# Patient Record
Sex: FEMALE | Race: WHITE | Employment: FULL TIME | URBAN - NONMETROPOLITAN AREA
[De-identification: names, ages, dates, MRNs, and addresses within clinical notes are randomized per-mention and may not be internally consistent; named-entity substitution may affect disease eponyms.]

---

## 2017-09-06 ENCOUNTER — OFFICE VISIT (OUTPATIENT)
Dept: GASTROENTEROLOGY | Age: 47
End: 2017-09-06
Payer: COMMERCIAL

## 2017-09-06 VITALS
OXYGEN SATURATION: 98 % | HEART RATE: 97 BPM | HEIGHT: 68 IN | SYSTOLIC BLOOD PRESSURE: 124 MMHG | DIASTOLIC BLOOD PRESSURE: 82 MMHG | WEIGHT: 252.8 LBS | BODY MASS INDEX: 38.31 KG/M2

## 2017-09-06 DIAGNOSIS — R19.7 DIARRHEA, UNSPECIFIED TYPE: ICD-10-CM

## 2017-09-06 PROCEDURE — 99213 OFFICE O/P EST LOW 20 MIN: CPT | Performed by: NURSE PRACTITIONER

## 2017-09-06 RX ORDER — DICYCLOMINE HYDROCHLORIDE 10 MG/1
10 CAPSULE ORAL
Qty: 270 CAPSULE | Refills: 3 | Status: SHIPPED | OUTPATIENT
Start: 2017-09-06

## 2017-09-06 ASSESSMENT — ENCOUNTER SYMPTOMS
ABDOMINAL DISTENTION: 0
ANAL BLEEDING: 0
PHOTOPHOBIA: 0
CHOKING: 0
DIARRHEA: 1
WHEEZING: 0
ABDOMINAL PAIN: 0
BACK PAIN: 1
NAUSEA: 0
SORE THROAT: 0
RECTAL PAIN: 0
BLOOD IN STOOL: 0
CONSTIPATION: 0
COUGH: 0
VOMITING: 0

## 2017-10-02 ENCOUNTER — OFFICE VISIT (OUTPATIENT)
Dept: OBGYN | Age: 47
End: 2017-10-02
Payer: COMMERCIAL

## 2017-10-02 VITALS
DIASTOLIC BLOOD PRESSURE: 72 MMHG | TEMPERATURE: 98.4 F | HEIGHT: 68 IN | HEART RATE: 110 BPM | BODY MASS INDEX: 37.59 KG/M2 | WEIGHT: 248 LBS | SYSTOLIC BLOOD PRESSURE: 135 MMHG

## 2017-10-02 DIAGNOSIS — N89.8 VAGINAL DISCHARGE: ICD-10-CM

## 2017-10-02 DIAGNOSIS — R61 NIGHT SWEATS: ICD-10-CM

## 2017-10-02 DIAGNOSIS — Z01.419 WOMEN'S ANNUAL ROUTINE GYNECOLOGICAL EXAMINATION: Primary | ICD-10-CM

## 2017-10-02 DIAGNOSIS — N76.0 BV (BACTERIAL VAGINOSIS): ICD-10-CM

## 2017-10-02 DIAGNOSIS — N92.1 MENOMETRORRHAGIA: ICD-10-CM

## 2017-10-02 DIAGNOSIS — Z12.31 ENCOUNTER FOR SCREENING MAMMOGRAM FOR BREAST CANCER: ICD-10-CM

## 2017-10-02 DIAGNOSIS — Z12.4 SCREENING FOR CERVICAL CANCER: ICD-10-CM

## 2017-10-02 DIAGNOSIS — B96.89 BV (BACTERIAL VAGINOSIS): ICD-10-CM

## 2017-10-02 PROCEDURE — 87210 SMEAR WET MOUNT SALINE/INK: CPT | Performed by: OBSTETRICS & GYNECOLOGY

## 2017-10-02 PROCEDURE — 99396 PREV VISIT EST AGE 40-64: CPT | Performed by: OBSTETRICS & GYNECOLOGY

## 2017-10-02 RX ORDER — METRONIDAZOLE 500 MG/1
500 TABLET ORAL 2 TIMES DAILY
Qty: 14 TABLET | Refills: 0 | Status: SHIPPED | OUTPATIENT
Start: 2017-10-02 | End: 2017-10-09

## 2017-10-02 RX ORDER — PRAVASTATIN SODIUM 40 MG
TABLET ORAL
COMMUNITY
Start: 2017-09-12

## 2017-10-02 ASSESSMENT — ENCOUNTER SYMPTOMS
EYES NEGATIVE: 1
ALLERGIC/IMMUNOLOGIC NEGATIVE: 1
GASTROINTESTINAL NEGATIVE: 1
RESPIRATORY NEGATIVE: 1

## 2017-10-02 NOTE — PROGRESS NOTES
Abdominal: Soft. Bowel sounds are normal. She exhibits no distension. There is no tenderness. Genitourinary: Rectum normal, vagina normal and uterus normal. There is no rash or lesion on the right labia. There is no rash or lesion on the left labia. Cervix exhibits no motion tenderness. Right adnexum displays no mass, no tenderness and no fullness. Left adnexum displays no mass, no tenderness and no fullness. Genitourinary Comments: Uterus 9 wk size   Musculoskeletal: Normal range of motion. She exhibits no edema or tenderness. Lymphadenopathy:     She has no cervical adenopathy. She has no axillary adenopathy. Neurological: She is alert and oriented to person, place, and time. Skin: Skin is warm and dry. Psychiatric: She has a normal mood and affect. Her behavior is normal. Judgment and thought content normal.       Assessment  1. Women's annual routine gynecological examination  JERRY DIGITAL SCREEN W OR WO CAD BILATERAL   2. Screening for cervical cancer  PAP SMEAR   3. Night sweats  TSH with Reflex    Follicle Stimulating Hormone    CBC   4. Menometrorrhagia  TSH with Reflex    Follicle Stimulating Hormone    CBC    US Pelvis Complete    US Non OB Transvaginal   5. Encounter for screening mammogram for breast cancer  JERRY DIGITAL SCREEN W OR WO CAD BILATERAL   6. Vaginal discharge  POCT Wet Prep   7. BV (bacterial vaginosis)  metroNIDAZOLE (FLAGYL) 500 MG tablet       Plan     1. Pap smear  2. Tsh, fsh, cbc  3. Mammogram  4. Wet prep  5. Pelvic ultrasound  6. Flagyl 500mg bid x 7days  7.  RTC one year or prn, will notify of results

## 2017-10-02 NOTE — MR AVS SNAPSHOT
After Visit Summary             [address]   10/2/2017 9:30 AM   Office Visit    Description:  Female : 1970   Provider:  Doug Santos MD   Department:  [address] [place] [address] Rd and Future Appointments         Below is a list of your follow-up and future appointments. This may not be a complete list as you may have made appointments directly with providers that we are not aware of or your providers may have made some for you. Please call your providers to confirm appointments. It is important to keep your appointments. Please bring your current insurance card, photo ID, co-pay, and all medication bottles to your appointment. If self-pay, payment is expected at the time of service. Your To-Do List     Future Appointments Provider Department Dept Phone    10/17/2017 10:30 AM [facility] ROOM 3 [facility] Ultrasound [phone]    PATIENT INSTRUCTIONS: *REGISTER AT [facility] 30 MINUTES PRIOR TO EXAM. *MUST HAVE WRITTEN ORDER. *32 OZ OF FLUID ONE HOUR BEFORE EXAM. *Patient is to report to [employer] [address] to register. *Upon entering the pavilion you will face a granite ball. Take the elevators tothe right to the second floor. Upon exiting the elevator you will face thefront door of the [facility]. 10/17/2017 11:15 AM [facility] US ROOM 3 [facility] Ultrasound [phone]    10/17/2017 11:45 AM [facility] MAMMO RM 1 [facility] [phone]    * Arrive 5 minutes prior to appointment. * No lotions, powders, or deodorants under the arms or in the breast area. * Bring previous mammogram films if not done here. * Report to [employer] [address] to register. Take the elevators to the second floor. Upon exiting the elevator you will face the front door of the [facility].     Future Orders Complete By Expires    CBC [FYG844 Custom]  10/2/2017 9983    Follicle Stimulating Hormone [LAB86 Custom]  10/2/2017 10/2/2018 overall health and may have suggested ways to take good care of yourself. He or she also may have recommended tests. At home, you can help prevent illness with healthy eating, regular exercise, and other steps. Follow-up care is a key part of your treatment and safety. Be sure to make and go to all appointments, and call your doctor if you are having problems. It's also a good idea to know your test results and keep a list of the medicines you take. How can you care for yourself at home? · Reach and stay at a healthy weight. This will lower your risk for many problems, such as obesity, diabetes, heart disease, and high blood pressure. · Get at least 30 minutes of physical activity on most days of the week. Walking is a good choice. You also may want to do other activities, such as running, swimming, cycling, or playing tennis or team sports. Discuss any changes in your exercise program with your doctor. · Do not smoke or allow others to smoke around you. If you need help quitting, talk to your doctor about stop-smoking programs and medicines. These can increase your chances of quitting for good. · Talk to your doctor about whether you have any risk factors for sexually transmitted infections (STIs). Having one sex partner (who does not have STIs and does not have sex with anyone else) is a good way to avoid these infections. · Use birth control if you do not want to have children at this time. Talk with your doctor about the choices available and what might be best for you. · Protect your skin from too much sun. When you're outdoors from 10 a.m. to 4 p.m., stay in the shade or cover up with clothing and a hat with a wide brim. Wear sunglasses that block UV rays. Even when it's cloudy, put broad-spectrum sunscreen (SPF 30 or higher) on any exposed skin. · See a dentist one or two times a year for checkups and to have your teeth cleaned. · Wear a seat belt in the car. have tests for STIs. You may be at risk if you have sex with more than one person, especially if you do not wear a condom. · Testicular cancer exam. Ask your doctor whether you should check your testicles regularly. · Prostate exam. Talk to your doctor about whether you should have a blood test (called a PSA test) for prostate cancer. Experts differ on whether and when men should have this test. Some experts suggest it if you are older than 39 and are -American or have a father or brother who got prostate cancer when he was younger than 72. When should you call for help? Watch closely for changes in your health, and be sure to contact your doctor if you have any problems or symptoms that concern you. Where can you learn more? Go to https://ConatixpeNovaforaeb.PanGo [email or web address] org and sign in to your [employer] account. Enter P072 in the Local Yokel Media box to learn more about \"Well Visit, Ages 25 to 48: Care Instructions. \"     If you do not have an account, please click on the \"Sign Up Now\" link. Current as of: July 19, 2016  Content Version: 11.3  © 0244-0889 [employer]urb. Care instructions adapted under license by [facility] ([facility]). If you have questions about a medical condition or this instruction, always ask your healthcare professional. [place] [address] any warranty or liability for your use of this information. Today's Medication Changes          These changes are accurate as of: 10/2/17 10:11 AM.  If you have any questions, ask your nurse or doctor. START taking these medications           metroNIDAZOLE 500 MG tablet   Commonly known as:  FLAGYL   Instructions:   Take 1 tablet by mouth 2 times daily for 7 days   Quantity:  14 tablet   Refills:  0   Started by:  Darylene Prudent, MD            Where to Get Your Medications      These medications were sent to [address] [place] [address], [address] aged 15-65 years at least once (lifetime) who have never been HIV tested. 8/24/1983    Tetanus Combination Vaccine (1 - Tdap) 8/24/1987    Pneumococcal Vaccine - Pneumovax for adults aged 19-64 years with: chronic heart disease, chronic lung disease, diabetes mellitus, alcoholism, chronic liver disease, or cigarette smoking. (1 of 1 - PPSV23) 8/24/1987    Cholesterol Screening 8/24/2008    Diabetes Screening 8/24/2008    Yearly Flu Vaccine (1) 9/1/2017    Pap Smear 5/10/2019    Colonoscopy 12/12/2026            MyChart Signup           Our records indicate that you have declined MyChart signup.

## 2017-10-02 NOTE — PATIENT INSTRUCTIONS
dentist one or two times a year for checkups and to have your teeth cleaned. · Wear a seat belt in the car. · Drink alcohol in moderation, if at all. That means no more than 2 drinks a day for men and 1 drink a day for women. Follow your doctor's advice about when to have certain tests. These tests can spot problems early. For everyone  · Cholesterol. Have the fat (cholesterol) in your blood tested after age 21. Your doctor will tell you how often to have this done based on your age, family history, or other things that can increase your risk for heart disease. · Blood pressure. Have your blood pressure checked during a routine doctor visit. Your doctor will tell you how often to check your blood pressure based on your age, your blood pressure results, and other factors. · Vision. Talk with your doctor about how often to have a glaucoma test.  · Diabetes. Ask your doctor whether you should have tests for diabetes. · Colon cancer. Have a test for colon cancer at age 48. You may have one of several tests. If you are younger than 48, you may need a test earlier if you have any risk factors. Risk factors include whether you already had a precancerous polyp removed from your colon or whether your parent, brother, sister, or child has had colon cancer. For women  · Breast exam and mammogram. Talk to your doctor about when you should have a clinical breast exam and a mammogram. Medical experts differ on whether and how often women under 50 should have these tests. Your doctor can help you decide what is right for you. · Pap test and pelvic exam. Begin Pap tests at age 24. A Pap test is the best way to find cervical cancer. The test often is part of a pelvic exam. Ask how often to have this test.  · Tests for sexually transmitted infections (STIs). Ask whether you should have tests for STIs. You may be at risk if you have sex with more than one person, especially if your partners do not wear condoms.   For

## 2017-10-17 ENCOUNTER — HOSPITAL ENCOUNTER (OUTPATIENT)
Dept: ULTRASOUND IMAGING | Age: 47
Discharge: HOME | End: 2017-10-17
Payer: COMMERCIAL

## 2017-10-17 ENCOUNTER — HOSPITAL ENCOUNTER (OUTPATIENT)
Dept: WOMENS IMAGING | Age: 47
Discharge: HOME | End: 2017-10-17
Payer: COMMERCIAL

## 2017-10-17 DIAGNOSIS — N92.1 MENOMETRORRHAGIA: ICD-10-CM

## 2017-10-17 DIAGNOSIS — Z01.419 WOMEN'S ANNUAL ROUTINE GYNECOLOGICAL EXAMINATION: ICD-10-CM

## 2017-10-17 DIAGNOSIS — Z12.31 ENCOUNTER FOR SCREENING MAMMOGRAM FOR BREAST CANCER: ICD-10-CM

## 2017-10-17 PROCEDURE — 76830 TRANSVAGINAL US NON-OB: CPT

## 2017-10-17 PROCEDURE — 76856 US EXAM PELVIC COMPLETE: CPT

## 2017-10-17 PROCEDURE — 77063 BREAST TOMOSYNTHESIS BI: CPT

## 2017-10-25 ENCOUNTER — TELEPHONE (OUTPATIENT)
Dept: OBGYN | Age: 47
End: 2017-10-25

## 2017-10-25 NOTE — TELEPHONE ENCOUNTER
Called and informed pt her pap and serology were negative. Told pt her ultrasound indicates that she has an enlarged uterus without fibroids, probable adenomyosis. Asked if pt wished to make appt to discuss treatments, appt made to discuss possible medical or surgical treatments.

## 2017-10-25 NOTE — TELEPHONE ENCOUNTER
----- Message from Sandra Herbert MD sent at 10/23/2017  2:44 PM CDT -----  Pap and serology negative

## 2017-11-03 ENCOUNTER — HOSPITAL ENCOUNTER (OUTPATIENT)
Dept: ULTRASOUND IMAGING | Age: 47
Discharge: HOME | End: 2017-11-03
Payer: COMMERCIAL

## 2017-11-03 ENCOUNTER — HOSPITAL ENCOUNTER (OUTPATIENT)
Dept: WOMENS IMAGING | Age: 47
Discharge: HOME | End: 2017-11-03
Payer: COMMERCIAL

## 2017-11-03 DIAGNOSIS — N63.0 BREAST NODULE: ICD-10-CM

## 2017-11-03 PROCEDURE — 76642 ULTRASOUND BREAST LIMITED: CPT

## 2017-11-03 PROCEDURE — G0279 TOMOSYNTHESIS, MAMMO: HCPCS

## 2017-11-20 ENCOUNTER — OFFICE VISIT (OUTPATIENT)
Dept: OBGYN | Age: 47
End: 2017-11-20
Payer: COMMERCIAL

## 2017-11-20 VITALS
HEIGHT: 68 IN | SYSTOLIC BLOOD PRESSURE: 127 MMHG | HEART RATE: 90 BPM | WEIGHT: 245 LBS | DIASTOLIC BLOOD PRESSURE: 78 MMHG | BODY MASS INDEX: 37.13 KG/M2

## 2017-11-20 DIAGNOSIS — N92.1 MENORRHAGIA WITH IRREGULAR CYCLE: Primary | ICD-10-CM

## 2017-11-20 PROCEDURE — 99213 OFFICE O/P EST LOW 20 MIN: CPT | Performed by: NURSE PRACTITIONER

## 2017-11-20 ASSESSMENT — ENCOUNTER SYMPTOMS
GASTROINTESTINAL NEGATIVE: 1
EYES NEGATIVE: 1
RESPIRATORY NEGATIVE: 1

## 2017-11-20 NOTE — PATIENT INSTRUCTIONS
advice. · If you are taking iron pills:  ¨ Try to take the pills about 1 hour before or 2 hours after meals. But you may need to take iron with some food to avoid an upset stomach. ¨ Vitamin C (from food or pills) helps your body absorb iron. Try taking iron pills with a glass of orange juice or other citrus fruit juice. ¨ Do not take antacids or drink milk or caffeine drinks (such as coffee, tea, or cola) at the same time or within 2 hours of the time that you take your iron. They can make it hard for your body to absorb the iron. ¨ Iron pills may cause stomach problems, such as heartburn, nausea, diarrhea, constipation, and cramps. Be sure to drink plenty of fluids, and include fruits, vegetables, and fiber in your diet each day. ¨ If you forget to take an iron pill, do not take a double dose of iron the next time you take a pill. ¨ Keep iron pills out of the reach of small children. An overdose of iron can be very dangerous. When should you call for help? Call 911 anytime you think you may need emergency care. For example, call if:  · You passed out (lost consciousness). · You have sudden, severe pain in your belly or pelvis. Call your doctor now or seek immediate medical care if:  · You have severe vaginal bleeding. This means that you are soaking through your usual pads or tampons each hour for 2 or more hours. · You are dizzy or lightheaded, or you feel like you may faint. · You have belly or pelvic pain when not menstruating. · You have a fever. · You have vaginal discharge with a bad odor. Watch closely for changes in your health, and be sure to contact your doctor if:  · Your heavy periods are disrupting your life. · You have vaginal bleeding when you do not expect it or after menopause. · You do not get better as expected. Where can you learn more? Go to https://Tailored Fitnolberto.healthMelodeo. org and sign in to your relocality account.  Enter F477 in the Tangoe box to learn

## 2017-11-20 NOTE — PROGRESS NOTES
Instructions    Many women get heavy menstrual periods and painful cramps. For some women, this means passing large blood clots and changing sanitary pads or tampons often. You may also have periods that last longer than 7 days. A change in hormones or an irritation in the uterus can cause heavy bleeding. Women who are overweight are more likely to have heavy menstrual periods. But there may not be a specific cause for your heavy menstrual periods. Your doctor may recommend hormone treatments to slow or stop your periods. If a fibroid (a growth that is not cancer) is causing your heavy bleeding, your doctor may recommend surgery or other treatments to remove the growth. Because blood loss from heavy menstrual periods can make you very tired and weak (anemic), your doctor may recommend that you take extra iron. Follow-up care is a key part of your treatment and safety. Be sure to make and go to all appointments, and call your doctor if you are having problems. It's also a good idea to know your test results and keep a list of the medicines you take. How can you care for yourself at home? · Get plenty of rest.  · Keep a record of your periods. Write down when your period begins and ends and how much flow you have. That means counting the number of pads and tampons you use. Note whether they are soaked. Note any other symptoms. Take this record to your doctor appointments. · Take your medicines exactly as prescribed. Call your doctor if you think you are having a problem with your medicine. · Take pain medicines exactly as directed. ¨ If the doctor gave you a prescription medicine for pain, take it as prescribed. ¨ If you are not taking a prescription pain medicine, ask your doctor if you can take an over-the-counter medicine. · Try to reach a healthy weight. If you are trying to lose weight, do it slowly with your doctor's advice.   · If you are taking iron pills:  ¨ Try to take the pills about 1 hour before account, please click on the \"Sign Up Now\" link. Current as of: October 13, 2016  Content Version: 11.3  © 2696-1702 DoctorBase, Incorporated. Care instructions adapted under license by [facility] ([facility]). If you have questions about a medical condition or this instruction, always ask your healthcare professional. [address] any warranty or liability for your use of this information.

## 2017-12-05 ENCOUNTER — OFFICE VISIT (OUTPATIENT)
Dept: OBGYN | Age: 47
End: 2017-12-05

## 2017-12-05 VITALS
SYSTOLIC BLOOD PRESSURE: 136 MMHG | WEIGHT: 248 LBS | HEIGHT: 68 IN | TEMPERATURE: 98.4 F | HEART RATE: 84 BPM | BODY MASS INDEX: 37.59 KG/M2 | DIASTOLIC BLOOD PRESSURE: 79 MMHG

## 2017-12-05 DIAGNOSIS — N85.2 ENLARGED UTERUS: ICD-10-CM

## 2017-12-05 DIAGNOSIS — N92.1 MENORRHAGIA WITH IRREGULAR CYCLE: Primary | ICD-10-CM

## 2017-12-05 PROCEDURE — PREOPEXAM PRE-OP EXAM: Performed by: OBSTETRICS & GYNECOLOGY

## 2017-12-05 ASSESSMENT — ENCOUNTER SYMPTOMS
GASTROINTESTINAL NEGATIVE: 1
RESPIRATORY NEGATIVE: 1
EYES NEGATIVE: 1

## 2017-12-05 NOTE — PATIENT INSTRUCTIONS
before your surgery. Make sure that you understand exactly what your doctor wants you to do. · Your doctor will tell you which medicines to take or stop before your surgery. You may need to stop taking certain medicines a week or more before surgery. So talk to your doctor as soon as you can. · If you have an advance directive, let your doctor know. It may include a living will and a durable power of  for health care. Bring a copy to the hospital. If you don't have one, you may want to prepare one. It lets your doctor and loved ones know your health care wishes. Doctors advise that everyone prepare these papers before any type of surgery or procedure. What happens on the day of surgery? · Follow the instructions exactly about when to stop eating and drinking. If you don't, your surgery may be canceled. If your doctor told you to take your medicines on the day of surgery, please take them with only a sip of water. · Take a bath or shower before you come in for your surgery. Do not apply lotions, perfumes, deodorants, or nail polish. · Do not shave the surgical site yourself. · Take off all jewelry and piercings. And take out contact lenses, if you wear them. At the hospital or surgery center  · Bring a picture ID. · You will be kept comfortable and safe by your anesthesia provider. You will be asleep during the surgery. · The surgery will take about 2 to 4 hours. Going home  · Be sure you have someone to drive you home. Anesthesia and pain medicine make it unsafe for you to drive. · You will be given more specific instructions about recovering from your surgery. They will cover things like diet, wound care, follow-up care, driving, and getting back to your normal routine. When should you call your doctor? · You have questions or concerns. · You do not understand how to prepare for your surgery. · You become ill before surgery (such as fever, flu, or a cold).   · You need to reschedule or have changed your mind about having the surgery. Where can you learn more? Go to https://chpepiceweb.[email or web address]. org and sign in to your Wit studio account. Enter D669 in the BridgeLux box to learn more about \"Laparoscopically Assisted Vaginal Hysterectomy: Before Your Surgery. \"     If you do not have an account, please click on the \"Sign Up Now\" link. Current as of: November 23, 2016  Content Version: 11.3  © 1842-1282 Beep, Incorporated. Care instructions adapted under license by [facility] ([facility]). If you have questions about a medical condition or this instruction, always ask your healthcare professional. [address] any warranty or liability for your use of this information.

## 2017-12-05 NOTE — PROGRESS NOTES
Subjective:      Patient ID: Josey Gentile is a 50 y.o. female. Patient presents today for a pre op visit. Patient is scheduled for a TLH BSO with Gauri Hale on 12/14/17. HPI  Pt is here for preop. Pt is having TLH/BSO for menorrhagia and enlarge uterus. Josey Gentile is a 50 y.o. female with the following history as recorded in [facility]:  Patient Active Problem List    Diagnosis Date Noted    Dyspepsia 09/30/2016    Loose stools 08/02/2016    Abdominal bloating 05/27/2016    Flatulence/gas pain/belching 05/27/2016    Alternating constipation and diarrhea 05/27/2016    Dysmenorrhea     Hydrosalpinx     Pelvic adhesive disease     Pelvic pain in female      Current Outpatient Prescriptions   Medication Sig Dispense Refill    pravastatin (PRAVACHOL) 40 MG tablet       dicyclomine (BENTYL) 10 MG capsule Take 1 capsule by mouth 3 times daily (before meals) 270 capsule 3     No current facility-administered medications for this visit.       Allergies: Latex  Past Medical History:   Diagnosis Date    Arthritis     Diarrhea     Hyperlipidemia     no meds; just watching it     Past Surgical History:   Procedure Laterality Date    COLONOSCOPY  2015    [facility]    COLONOSCOPY N/A 12/12/2016    Dr Ludie Cooks Larissa Better yr recall    TN EGD TRANSORAL BIOPSY SINGLE/MULTIPLE N/A 12/12/2016    Dr Ludie Cooks Fuetnes-Gastritis    SALPINGECTOMY Bilateral 5/11/2016    EXAM UNDER ANESTHESIA; DIAGNOSTIC LAPAROSCOPY, RIGHT SALPINGECTOMY, WITH LYSIS OF ADHESIONS, PAPSMEAR  performed by Myles Quick MD at [facility] [place]      UPPER GASTROINTESTINAL ENDOSCOPY  12/12/2016     Family History   Problem Relation Age of Onset    Other Mother      intestinal problems    Colon Polyps Mother     Cancer Father      esophogus    Diabetes Father     Heart Failure Father     Esophageal Cancer Father     Stomach Cancer Maternal Grandmother     Heart Failure Brother     Colon Polyps Maternal Grandfather     Colon Cancer Neg Hx     Liver Cancer Neg Hx     Liver Disease Neg Hx     Rectal Cancer Neg Hx      Social History   Substance Use Topics    Smoking status: Current Every Day Smoker     Packs/day: 1.00     Years: 37.00     Types: Cigarettes    Smokeless tobacco: Never Used    Alcohol use 0.0 oz/week      Comment: rarely       Review of Systems   Constitutional: Negative. HENT: Negative. Eyes: Negative. Respiratory: Negative. Cardiovascular: Negative. Gastrointestinal: Negative. Genitourinary: Positive for menstrual problem. Musculoskeletal: Negative. Skin: Negative. Neurological: Negative. Psychiatric/Behavioral: Negative. Objective:   Physical Exam   Constitutional: She is oriented to person, place, and time. She appears well-developed and well-nourished. HENT:   Head: Normocephalic and atraumatic. Eyes: Pupils are equal, round, and reactive to light. Neck: Normal range of motion. Neck supple. Cardiovascular: Normal rate and regular rhythm. Pulmonary/Chest: Effort normal and breath sounds normal.   Abdominal: Soft. She exhibits no distension. There is no tenderness. Musculoskeletal: Normal range of motion. Neurological: She is alert and oriented to person, place, and time. Skin: Skin is warm and dry. Psychiatric: She has a normal mood and affect. Her behavior is normal.       Assessment:      1. Menorrhagia with irregular cycle    2. Enlarged uterus            Plan:      Counseling was offered and accepted by patient. Discussed at length the risks, benefits and alternatives to surgery including medical management and no intervention. Pt is in agreement with TLH/BSO. Some patients will need a full medical clearance. Preop testing ordered and we will review scheduling to determine date and time. Surgery is michael'd on 12/14/17 at [facility] for surgery signed and all questions proceeding.  All questions answered and patient voiced understanding of above.

## 2017-12-07 ENCOUNTER — APPOINTMENT (OUTPATIENT)
Dept: PREADMISSION TESTING | Facility: HOSPITAL | Age: 47
End: 2017-12-07

## 2017-12-07 VITALS
HEART RATE: 80 BPM | OXYGEN SATURATION: 98 % | SYSTOLIC BLOOD PRESSURE: 133 MMHG | WEIGHT: 245 LBS | HEIGHT: 68 IN | RESPIRATION RATE: 20 BRPM | BODY MASS INDEX: 37.13 KG/M2 | DIASTOLIC BLOOD PRESSURE: 80 MMHG

## 2017-12-07 LAB
ANION GAP SERPL CALCULATED.3IONS-SCNC: 6 MMOL/L (ref 4–13)
BACTERIA UR QL AUTO: ABNORMAL /HPF
BASOPHILS # BLD AUTO: 0.07 10*3/MM3 (ref 0–0.2)
BASOPHILS NFR BLD AUTO: 0.8 % (ref 0–2)
BILIRUB UR QL STRIP: NEGATIVE
BUN BLD-MCNC: 10 MG/DL (ref 5–21)
BUN/CREAT SERPL: 12.8 (ref 7–25)
CALCIUM SPEC-SCNC: 9.6 MG/DL (ref 8.4–10.4)
CHLORIDE SERPL-SCNC: 104 MMOL/L (ref 98–110)
CLARITY UR: CLEAR
CO2 SERPL-SCNC: 26 MMOL/L (ref 24–31)
COLOR UR: YELLOW
CREAT BLD-MCNC: 0.78 MG/DL (ref 0.5–1.4)
DEPRECATED RDW RBC AUTO: 40.3 FL (ref 40–54)
EOSINOPHIL # BLD AUTO: 0.13 10*3/MM3 (ref 0–0.7)
EOSINOPHIL NFR BLD AUTO: 1.5 % (ref 0–4)
ERYTHROCYTE [DISTWIDTH] IN BLOOD BY AUTOMATED COUNT: 12.9 % (ref 12–15)
GFR SERPL CREATININE-BSD FRML MDRD: 78 ML/MIN/1.73
GLUCOSE BLD-MCNC: 85 MG/DL (ref 70–100)
GLUCOSE UR STRIP-MCNC: NEGATIVE MG/DL
HCT VFR BLD AUTO: 41.4 % (ref 37–47)
HGB BLD-MCNC: 14.3 G/DL (ref 12–16)
HGB UR QL STRIP.AUTO: ABNORMAL
HYALINE CASTS UR QL AUTO: ABNORMAL /LPF
IMM GRANULOCYTES # BLD: 0.02 10*3/MM3 (ref 0–0.03)
IMM GRANULOCYTES NFR BLD: 0.2 % (ref 0–5)
KETONES UR QL STRIP: NEGATIVE
LEUKOCYTE ESTERASE UR QL STRIP.AUTO: NEGATIVE
LYMPHOCYTES # BLD AUTO: 3.15 10*3/MM3 (ref 0.72–4.86)
LYMPHOCYTES NFR BLD AUTO: 37.5 % (ref 15–45)
MCH RBC QN AUTO: 29.7 PG (ref 28–32)
MCHC RBC AUTO-ENTMCNC: 34.5 G/DL (ref 33–36)
MCV RBC AUTO: 86.1 FL (ref 82–98)
MONOCYTES # BLD AUTO: 0.69 10*3/MM3 (ref 0.19–1.3)
MONOCYTES NFR BLD AUTO: 8.2 % (ref 4–12)
NEUTROPHILS # BLD AUTO: 4.33 10*3/MM3 (ref 1.87–8.4)
NEUTROPHILS NFR BLD AUTO: 51.8 % (ref 39–78)
NITRITE UR QL STRIP: NEGATIVE
NRBC BLD MANUAL-RTO: 0 /100 WBC (ref 0–0)
PH UR STRIP.AUTO: 5.5 [PH] (ref 5–8)
PLATELET # BLD AUTO: 287 10*3/MM3 (ref 130–400)
PMV BLD AUTO: 10.5 FL (ref 6–12)
POTASSIUM BLD-SCNC: 4.5 MMOL/L (ref 3.5–5.3)
PROT UR QL STRIP: NEGATIVE
RBC # BLD AUTO: 4.81 10*6/MM3 (ref 4.2–5.4)
RBC # UR: ABNORMAL /HPF
REF LAB TEST METHOD: ABNORMAL
SODIUM BLD-SCNC: 136 MMOL/L (ref 135–145)
SP GR UR STRIP: 1.02 (ref 1–1.03)
SQUAMOUS #/AREA URNS HPF: ABNORMAL /HPF
UROBILINOGEN UR QL STRIP: ABNORMAL
WBC NRBC COR # BLD: 8.39 10*3/MM3 (ref 4.8–10.8)
WBC UR QL AUTO: ABNORMAL /HPF

## 2017-12-07 PROCEDURE — 80048 BASIC METABOLIC PNL TOTAL CA: CPT | Performed by: OBSTETRICS & GYNECOLOGY

## 2017-12-07 PROCEDURE — 81001 URINALYSIS AUTO W/SCOPE: CPT | Performed by: OBSTETRICS & GYNECOLOGY

## 2017-12-07 PROCEDURE — 93010 ELECTROCARDIOGRAM REPORT: CPT | Performed by: INTERNAL MEDICINE

## 2017-12-07 PROCEDURE — 85025 COMPLETE CBC W/AUTO DIFF WBC: CPT | Performed by: OBSTETRICS & GYNECOLOGY

## 2017-12-07 PROCEDURE — 93005 ELECTROCARDIOGRAM TRACING: CPT

## 2017-12-07 RX ORDER — PRAVASTATIN SODIUM 20 MG
40 TABLET ORAL DAILY
COMMUNITY
End: 2018-06-04

## 2017-12-07 RX ORDER — DICYCLOMINE HYDROCHLORIDE 10 MG/1
10 CAPSULE ORAL 2 TIMES DAILY
COMMUNITY
End: 2018-06-04 | Stop reason: SDUPTHER

## 2017-12-07 NOTE — DISCHARGE INSTRUCTIONS
DAY OF SURGERY INSTRUCTIONS        YOUR SURGEON: dr arredondo    PROCEDURE: ***TOTAL LAPAROSCOPIC HYSTERECTOMY BILATERAL SALPINGECTOMY WITH DAVINCI SI ROBOT, CYSTO    DATE OF SURGERY: ***12/14/2017    ARRIVAL TIME: AS DIRECTED BY OFFICE    DAY OF SURGERY TAKE ONLY THESE MEDICATIONS: ***none        BEFORE YOU COME TO THE HOSPITAL  (Pre-op instructions)  • Do not eat, drink, smoke or chew gum after midnight the night before surgery.  This also includes no mints.  • Morning of surgery take only the medicines you have been instructed with a sip of water unless otherwise instructed  by your physician.  • Do not shave, wear makeup or dark nail polish.  • Remove all jewelry including rings.  • Leave anything you consider valuable at home.  • Leave your suitcase in the car until after your surgery.  • Bring the following with you if applicable:  o Picture ID and insurance, Medicare or Medicaid cards  o Co-pay/deductible required by insurance (cash, check, credit card)  o Copy of advance directive, living will or power-of- documents if not brought to PAT  o CPAP or BIPAP mask and tubing  o Relaxation aids (MP3 player, book, magazine)  • On the day of surgery check in at registration located at the main entrance of the hospital.       Outpatient Surgery Guidelines, Adult  Outpatient procedures are those for which the person having the procedure is allowed to go home the same day as the procedure. Various procedures are done on an outpatient basis. You should follow some general guidelines if you will be having an outpatient procedure.  LET YOUR HEALTH CARE PROVIDER KNOW ABOUT:  · Any allergies you have.  · All medicines you are taking, including vitamins, herbs, eye drops, creams, and over-the-counter medicines.  · Previous problems you or members of your family have had with the use of anesthetics.  · Any blood disorders you have.  · Previous surgeries you have had.  · Medical conditions you have.  RISKS AND  COMPLICATIONS  Your health care provider will discuss possible risks and complications with you before surgery. Common risks and complications include:    · Problems due to the use of anesthetics.  · Blood loss and replacement (does not apply to minor surgical procedures).  · Temporary increase in pain due to surgery.  · Uncorrected pain or problems that the surgery was meant to correct.  · Infection.  · New damage.  BEFORE THE PROCEDURE  · Ask your health care provider about changing or stopping your regular medicines. You may need to stop taking certain medicines in the days or weeks before the procedure.  · Stop smoking at least 2 weeks before surgery. This lowers your risk for complications during and after surgery. Ask your health care provider for help with this if needed.  · Eat your usual meals and a light supper the day before surgery. Continue fluid intake. Do not drink alcohol.  · Do not eat or drink after midnight the night before your surgery.   · Arrange for someone to take you home and to stay with you for 24 hours after the procedure. Medicine given for your procedure may affect your ability to drive or to care for yourself.  · Call your health care provider's office if you develop an illness or problem that may prevent you from safely having your procedure.  AFTER THE PROCEDURE  After surgery, you will be taken to a recovery area, where your progress will be monitored. If there are no complications, you will be allowed to go home when you are awake, stable, and taking fluids well. You may have numbness around the surgical site. Healing will take some time. You will have tenderness at the surgical site and may have some swelling and bruising. You may also have some nausea.  HOME CARE INSTRUCTIONS  · Do not drive for 24 hours, or as directed by your health care provider. Do not drive while taking prescription pain medicines.  · Do not drink alcohol for 24 hours.  · Do not make important decisions or  sign legal documents for 24 hours.  · You may resume a normal diet and activities as directed.  · Do not lift anything heavier than 10 pounds (4.5 kg) or play contact sports until your health care provider says it is okay.  · Change your bandages (dressings) as directed.  · Only take over-the-counter or prescription medicines as directed by your health care provider.  · Follow up with your health care provider as directed.  SEEK MEDICAL CARE IF:  · You have increased bleeding (more than a small spot) from the surgical site.  · You have redness, swelling, or increasing pain in the wound.  · You see pus coming from the wound.  · You have a fever.  · You notice a bad smell coming from the wound or dressing.  · You feel lightheaded or faint.  · You develop a rash.  · You have trouble breathing.  · You develop allergies.  MAKE SURE YOU:  · Understand these instructions.  · Will watch your condition.  · Will get help right away if you are not doing well or get worse.     This information is not intended to replace advice given to you by your health care provider. Make sure you discuss any questions you have with your health care provider.     Document Released: 09/12/2002 Document Revised: 05/03/2016 Document Reviewed: 05/22/2014  [employer] Interactive Patient Education ©2016 [employer]       Fall Prevention in Hospitals, Adult  As a hospital patient, your condition and the treatments you receive can increase your risk for falls. Some additional risk factors for falls in a hospital include:  · Being in an unfamiliar environment.  · Being on bed rest.  · Your surgery.  · Taking certain medicines.  · Your tubing requirements, such as intravenous (IV) therapy or catheters.  It is important that you learn how to decrease fall risks while at the hospital. Below are important tips that can help prevent falls.  SAFETY TIPS FOR PREVENTING FALLS  Talk about your risk of falling.  · Ask your health care provider why you are at  risk for falling. Is it your medicine, illness, tubing placement, or something else?  · Make a plan with your health care provider to keep you safe from falls.  · Ask your health care provider or pharmacist about side effects of your medicines. Some medicines can make you dizzy or affect your coordination.  Ask for help.  · Ask for help before getting out of bed. You may need to press your call button.  · Ask for assistance in getting safely to the toilet.  · Ask for a walker or cane to be put at your bedside. Ask that most of the side rails on your bed be placed up before your health care provider leaves the room.  · Ask family or friends to sit with you.  · Ask for things that are out of your reach, such as your glasses, hearing aids, telephone, bedside table, or call button.  Follow these tips to avoid falling:  · Stay lying or seated, rather than standing, while waiting for help.  · Wear rubber-soled slippers or shoes whenever you walk in the hospital.  · Avoid quick, sudden movements.  ¨ Change positions slowly.  ¨ Sit on the side of your bed before standing.  ¨ Stand up slowly and wait before you start to walk.  · Let your health care provider know if there is a spill on the floor.  · Pay careful attention to the medical equipment, electrical cords, and tubes around you.  · When you need help, use your call button by your bed or in the bathroom. Wait for one of your health care providers to help you.  · If you feel dizzy or unsure of your footing, return to bed and wait for assistance.  · Avoid being distracted by the TV, telephone, or another person in your room.  · Do not lean or support yourself on rolling objects, such as IV poles or bedside tables.     This information is not intended to replace advice given to you by your health care provider. Make sure you discuss any questions you have with your health care provider.     Document Released: 12/15/2001 Document Revised: 01/08/2016 Document Reviewed:  08/25/2013  [employer] Interactive Patient Education ©2016 [employer]       Surgical Site Infections FAQs  What is a Surgical Site Infection (SSI)?  A surgical site infection is an infection that occurs after surgery in the part of the body where the surgery took place. Most patients who have surgery do not develop an infection. However, infections develop in about 1 to 3 out of every 100 patients who have surgery.  Some of the common symptoms of a surgical site infection are:  · Redness and pain around the area where you had surgery  · Drainage of cloudy fluid from your surgical wound  · Fever  Can SSIs be treated?  Yes. Most surgical site infections can be treated with antibiotics. The antibiotic given to you depends on the bacteria (germs) causing the infection. Sometimes patients with SSIs also need another surgery to treat the infection.  What are some of the things that hospitals are doing to prevent SSIs?  To prevent SSIs, doctors, nurses, and other healthcare providers:  · Clean their hands and arms up to their elbows with an antiseptic agent just before the surgery.  · Clean their hands with soap and water or an alcohol-based hand rub before and after caring for each patient.  · May remove some of your hair immediately before your surgery using electric clippers if the hair is in the same area where the procedure will occur. They should not shave you with a razor.  · Wear special hair covers, masks, gowns, and gloves during surgery to keep the surgery area clean.  · Give you antibiotics before your surgery starts. In most cases, you should get antibiotics within 60 minutes before the surgery starts and the antibiotics should be stopped within 24 hours after surgery.  · Clean the skin at the site of your surgery with a special soap that kills germs.  What can I do to help prevent SSIs?  Before your surgery:  · Tell your doctor about other medical problems you may have. Health problems such as allergies,  diabetes, and obesity could affect your surgery and your treatment.  · Quit smoking. Patients who smoke get more infections. Talk to your doctor about how you can quit before your surgery.  · Do not shave near where you will have surgery. Shaving with a razor can irritate your skin and make it easier to develop an infection.  At the time of your surgery:  · Speak up if someone tries to shave you with a razor before surgery. Ask why you need to be shaved and talk with your surgeon if you have any concerns.  · Ask if you will get antibiotics before surgery.  After your surgery:  · Make sure that your healthcare providers clean their hands before examining you, either with soap and water or an alcohol-based hand rub.  · If you do not see your providers clean their hands, please ask them to do so.  · Family and friends who visit you should not touch the surgical wound or dressings.  · Family and friends should clean their hands with soap and water or an alcohol-based hand rub before and after visiting you. If you do not see them clean their hands, ask them to clean their hands.  What do I need to do when I go home from the hospital?  · Before you go home, your doctor or nurse should explain everything you need to know about taking care of your wound. Make sure you understand how to care for your wound before you leave the hospital.  · Always clean your hands before and after caring for your wound.  · Before you go home, make sure you know who to contact if you have questions or problems after you get home.  · If you have any symptoms of an infection, such as redness and pain at the surgery site, drainage, or fever, call your doctor immediately.  If you have additional questions, please ask your doctor or nurse.  Developed and co-sponsored by The Society for Healthcare Epidemiology of [place] (SHEA); Infectious Diseases Society of [place] (IDSA); American Hospital Association; Association for Professionals in Infection  Control and Epidemiology (APIC); Centers for Disease Control and Prevention (CDC); and The Joint Commission.     This information is not intended to replace advice given to you by your health care provider. Make sure you discuss any questions you have with your health care provider.     Document Released: 12/23/2014 Document Revised: 01/08/2016 Document Reviewed: 03/02/2016  [employer] Interactive Patient Education ©2016 [employer]       [facility]  CHG 4% Patient Instruction Sheet    Preparing the Skin Before Surgery  Preparing or “prepping” skin before surgery can reduce the risk of infection at the surgical site. To make the process easier,[facility] has chosen 4% Chlorhexidine Gluconate (CHG) antiseptic solution.   The steps below outline the prepping process and should be carefully followed.                                                                                                                                                      Prep the skin at the following time(s):                                                      We recommend you shower the night before surgery, and again the morning of surgery with the 4% CHG antiseptic solution using half of the bottle and a cloth each time.  Dress in clean clothes/sleepwear after showering.  See instructions below for application.          Do not apply any lotions or moisturizers.       Do not shave the area to be prepped for at least 2 days prior to surgery.    Clipping the hair may be done immediately prior to your surgery at the hospital    if needed.    Directions:  Thoroughly rinse your body with water.  Apply 4% CHG to a cloth and wash skin gently, paying special attention to the operative site.  Rinse again thoroughly.  Once you have begun using this product do not apply anything else to your skin. If itching or redness persists, rinse affected areas and discontinue use.    When using this product:  • Keep out of eyes, ears, and mouth.  • If  solution should contact these areas, rinse out promptly and thoroughly with water.  • For external use only.  • Do not use in genital area, on your face or head.      PATIENT/FAMILY/RESPONSIBLE PARTY VERBALIZES UNDERSTANDING OF ABOVE EDUCATION.  COPY OF PAIN SCALE GIVEN AND REVIEWED WITH VERBALIZED UNDERSTANDING.

## 2017-12-14 ENCOUNTER — ANESTHESIA EVENT (OUTPATIENT)
Dept: PERIOP | Facility: HOSPITAL | Age: 47
End: 2017-12-14

## 2017-12-14 ENCOUNTER — HOSPITAL ENCOUNTER (OUTPATIENT)
Facility: HOSPITAL | Age: 47
Setting detail: HOSPITAL OUTPATIENT SURGERY
Discharge: HOME | End: 2017-12-14
Attending: OBSTETRICS & GYNECOLOGY | Admitting: OBSTETRICS & GYNECOLOGY

## 2017-12-14 ENCOUNTER — ANESTHESIA (OUTPATIENT)
Dept: PERIOP | Facility: HOSPITAL | Age: 47
End: 2017-12-14

## 2017-12-14 VITALS
HEART RATE: 89 BPM | TEMPERATURE: 97.9 F | SYSTOLIC BLOOD PRESSURE: 112 MMHG | RESPIRATION RATE: 16 BRPM | DIASTOLIC BLOOD PRESSURE: 73 MMHG | OXYGEN SATURATION: 98 %

## 2017-12-14 DIAGNOSIS — N92.0 MENORRHAGIA: ICD-10-CM

## 2017-12-14 DIAGNOSIS — N85.2 ENLARGED UTERUS: ICD-10-CM

## 2017-12-14 LAB
ABO GROUP BLD: NORMAL
B-HCG UR QL: NEGATIVE
BLD GP AB SCN SERPL QL: NEGATIVE
RH BLD: POSITIVE

## 2017-12-14 PROCEDURE — 25010000003 CEFAZOLIN PER 500 MG: Performed by: OBSTETRICS & GYNECOLOGY

## 2017-12-14 PROCEDURE — 25010000002 PROPOFOL 10 MG/ML EMULSION: Performed by: NURSE ANESTHETIST, CERTIFIED REGISTERED

## 2017-12-14 PROCEDURE — 86850 RBC ANTIBODY SCREEN: CPT | Performed by: OBSTETRICS & GYNECOLOGY

## 2017-12-14 PROCEDURE — 25010000002 MIDAZOLAM PER 1 MG: Performed by: ANESTHESIOLOGY

## 2017-12-14 PROCEDURE — 25010000002 ONDANSETRON PER 1 MG: Performed by: NURSE ANESTHETIST, CERTIFIED REGISTERED

## 2017-12-14 PROCEDURE — 25010000002 KETOROLAC TROMETHAMINE PER 15 MG: Performed by: NURSE ANESTHETIST, CERTIFIED REGISTERED

## 2017-12-14 PROCEDURE — 25010000002 SUCCINYLCHOLINE PER 20 MG: Performed by: NURSE ANESTHETIST, CERTIFIED REGISTERED

## 2017-12-14 PROCEDURE — 81025 URINE PREGNANCY TEST: CPT | Performed by: OBSTETRICS & GYNECOLOGY

## 2017-12-14 PROCEDURE — 25010000002 FENTANYL CITRATE (PF) 250 MCG/5ML SOLUTION: Performed by: NURSE ANESTHETIST, CERTIFIED REGISTERED

## 2017-12-14 PROCEDURE — 25010000002 MORPHINE PER 10 MG: Performed by: NURSE ANESTHETIST, CERTIFIED REGISTERED

## 2017-12-14 PROCEDURE — 25010000002 DEXAMETHASONE PER 1 MG: Performed by: ANESTHESIOLOGY

## 2017-12-14 PROCEDURE — 25010000002 ONDANSETRON PER 1 MG: Performed by: OBSTETRICS & GYNECOLOGY

## 2017-12-14 PROCEDURE — 86901 BLOOD TYPING SEROLOGIC RH(D): CPT | Performed by: OBSTETRICS & GYNECOLOGY

## 2017-12-14 PROCEDURE — 25010000002 HYDROMORPHONE PER 4 MG: Performed by: NURSE ANESTHETIST, CERTIFIED REGISTERED

## 2017-12-14 PROCEDURE — 88307 TISSUE EXAM BY PATHOLOGIST: CPT | Performed by: OBSTETRICS & GYNECOLOGY

## 2017-12-14 PROCEDURE — 25010000002 DEXAMETHASONE PER 1 MG: Performed by: NURSE ANESTHETIST, CERTIFIED REGISTERED

## 2017-12-14 PROCEDURE — 86900 BLOOD TYPING SEROLOGIC ABO: CPT | Performed by: OBSTETRICS & GYNECOLOGY

## 2017-12-14 RX ORDER — KETOROLAC TROMETHAMINE 30 MG/ML
INJECTION, SOLUTION INTRAMUSCULAR; INTRAVENOUS AS NEEDED
Status: DISCONTINUED | OUTPATIENT
Start: 2017-12-14 | End: 2017-12-14 | Stop reason: SURG

## 2017-12-14 RX ORDER — ONDANSETRON 2 MG/ML
4 INJECTION INTRAMUSCULAR; INTRAVENOUS AS NEEDED
Status: DISCONTINUED | OUTPATIENT
Start: 2017-12-14 | End: 2017-12-14 | Stop reason: HOSPADM

## 2017-12-14 RX ORDER — LIDOCAINE HYDROCHLORIDE 40 MG/ML
SOLUTION TOPICAL AS NEEDED
Status: DISCONTINUED | OUTPATIENT
Start: 2017-12-14 | End: 2017-12-14 | Stop reason: SURG

## 2017-12-14 RX ORDER — MIDAZOLAM HYDROCHLORIDE 1 MG/ML
1 INJECTION INTRAMUSCULAR; INTRAVENOUS
Status: DISCONTINUED | OUTPATIENT
Start: 2017-12-14 | End: 2017-12-14 | Stop reason: HOSPADM

## 2017-12-14 RX ORDER — SODIUM CHLORIDE, SODIUM LACTATE, POTASSIUM CHLORIDE, CALCIUM CHLORIDE 600; 310; 30; 20 MG/100ML; MG/100ML; MG/100ML; MG/100ML
1000 INJECTION, SOLUTION INTRAVENOUS CONTINUOUS
Status: DISCONTINUED | OUTPATIENT
Start: 2017-12-14 | End: 2017-12-14 | Stop reason: HOSPADM

## 2017-12-14 RX ORDER — SODIUM CHLORIDE 0.9 % (FLUSH) 0.9 %
1-10 SYRINGE (ML) INJECTION AS NEEDED
Status: DISCONTINUED | OUTPATIENT
Start: 2017-12-14 | End: 2017-12-14 | Stop reason: HOSPADM

## 2017-12-14 RX ORDER — SUCCINYLCHOLINE CHLORIDE 20 MG/ML
INJECTION INTRAMUSCULAR; INTRAVENOUS AS NEEDED
Status: DISCONTINUED | OUTPATIENT
Start: 2017-12-14 | End: 2017-12-14 | Stop reason: SURG

## 2017-12-14 RX ORDER — PHENAZOPYRIDINE HYDROCHLORIDE 100 MG/1
100 TABLET, FILM COATED ORAL ONCE
Status: COMPLETED | OUTPATIENT
Start: 2017-12-14 | End: 2017-12-14

## 2017-12-14 RX ORDER — SODIUM CHLORIDE, SODIUM LACTATE, POTASSIUM CHLORIDE, CALCIUM CHLORIDE 600; 310; 30; 20 MG/100ML; MG/100ML; MG/100ML; MG/100ML
20 INJECTION, SOLUTION INTRAVENOUS CONTINUOUS
Status: DISCONTINUED | OUTPATIENT
Start: 2017-12-14 | End: 2017-12-14 | Stop reason: HOSPADM

## 2017-12-14 RX ORDER — DEXAMETHASONE SODIUM PHOSPHATE 4 MG/ML
INJECTION, SOLUTION INTRA-ARTICULAR; INTRALESIONAL; INTRAMUSCULAR; INTRAVENOUS; SOFT TISSUE AS NEEDED
Status: DISCONTINUED | OUTPATIENT
Start: 2017-12-14 | End: 2017-12-14 | Stop reason: SURG

## 2017-12-14 RX ORDER — LABETALOL HYDROCHLORIDE 5 MG/ML
5 INJECTION, SOLUTION INTRAVENOUS
Status: DISCONTINUED | OUTPATIENT
Start: 2017-12-14 | End: 2017-12-14 | Stop reason: HOSPADM

## 2017-12-14 RX ORDER — HYDROCODONE BITARTRATE AND ACETAMINOPHEN 5; 325 MG/1; MG/1
2 TABLET ORAL ONCE AS NEEDED
Status: DISCONTINUED | OUTPATIENT
Start: 2017-12-14 | End: 2017-12-14 | Stop reason: HOSPADM

## 2017-12-14 RX ORDER — ROCURONIUM BROMIDE 10 MG/ML
INJECTION, SOLUTION INTRAVENOUS AS NEEDED
Status: DISCONTINUED | OUTPATIENT
Start: 2017-12-14 | End: 2017-12-14 | Stop reason: SURG

## 2017-12-14 RX ORDER — MORPHINE SULFATE 2 MG/ML
2 INJECTION, SOLUTION INTRAMUSCULAR; INTRAVENOUS AS NEEDED
Status: DISCONTINUED | OUTPATIENT
Start: 2017-12-14 | End: 2017-12-14 | Stop reason: HOSPADM

## 2017-12-14 RX ORDER — METOCLOPRAMIDE HYDROCHLORIDE 5 MG/ML
5 INJECTION INTRAMUSCULAR; INTRAVENOUS
Status: DISCONTINUED | OUTPATIENT
Start: 2017-12-14 | End: 2017-12-14 | Stop reason: HOSPADM

## 2017-12-14 RX ORDER — MORPHINE SULFATE 10 MG/ML
INJECTION INTRAMUSCULAR; INTRAVENOUS; SUBCUTANEOUS AS NEEDED
Status: DISCONTINUED | OUTPATIENT
Start: 2017-12-14 | End: 2017-12-14 | Stop reason: SURG

## 2017-12-14 RX ORDER — ONDANSETRON 8 MG/1
8 TABLET, ORALLY DISINTEGRATING ORAL EVERY 8 HOURS PRN
Qty: 15 TABLET | Refills: 0 | Status: SHIPPED | OUTPATIENT
Start: 2017-12-14 | End: 2022-12-07

## 2017-12-14 RX ORDER — MAGNESIUM HYDROXIDE 1200 MG/15ML
LIQUID ORAL AS NEEDED
Status: DISCONTINUED | OUTPATIENT
Start: 2017-12-14 | End: 2017-12-14 | Stop reason: HOSPADM

## 2017-12-14 RX ORDER — SODIUM CHLORIDE, SODIUM LACTATE, POTASSIUM CHLORIDE, CALCIUM CHLORIDE 600; 310; 30; 20 MG/100ML; MG/100ML; MG/100ML; MG/100ML
100 INJECTION, SOLUTION INTRAVENOUS CONTINUOUS
Status: DISCONTINUED | OUTPATIENT
Start: 2017-12-14 | End: 2017-12-14 | Stop reason: HOSPADM

## 2017-12-14 RX ORDER — IPRATROPIUM BROMIDE AND ALBUTEROL SULFATE 2.5; .5 MG/3ML; MG/3ML
3 SOLUTION RESPIRATORY (INHALATION) ONCE
Status: COMPLETED | OUTPATIENT
Start: 2017-12-14 | End: 2017-12-14

## 2017-12-14 RX ORDER — LIDOCAINE HYDROCHLORIDE 20 MG/ML
INJECTION, SOLUTION INFILTRATION; PERINEURAL AS NEEDED
Status: DISCONTINUED | OUTPATIENT
Start: 2017-12-14 | End: 2017-12-14 | Stop reason: SURG

## 2017-12-14 RX ORDER — HYDROMORPHONE HCL 110MG/55ML
PATIENT CONTROLLED ANALGESIA SYRINGE INTRAVENOUS AS NEEDED
Status: DISCONTINUED | OUTPATIENT
Start: 2017-12-14 | End: 2017-12-14 | Stop reason: SURG

## 2017-12-14 RX ORDER — SODIUM CHLORIDE 0.9 % (FLUSH) 0.9 %
3 SYRINGE (ML) INJECTION AS NEEDED
Status: DISCONTINUED | OUTPATIENT
Start: 2017-12-14 | End: 2017-12-14 | Stop reason: HOSPADM

## 2017-12-14 RX ORDER — FENTANYL CITRATE 50 UG/ML
INJECTION, SOLUTION INTRAMUSCULAR; INTRAVENOUS AS NEEDED
Status: DISCONTINUED | OUTPATIENT
Start: 2017-12-14 | End: 2017-12-14 | Stop reason: SURG

## 2017-12-14 RX ORDER — MEPERIDINE HYDROCHLORIDE 25 MG/ML
12.5 INJECTION INTRAMUSCULAR; INTRAVENOUS; SUBCUTANEOUS
Status: DISCONTINUED | OUTPATIENT
Start: 2017-12-14 | End: 2017-12-14 | Stop reason: HOSPADM

## 2017-12-14 RX ORDER — FLUMAZENIL 0.1 MG/ML
0.2 INJECTION INTRAVENOUS AS NEEDED
Status: DISCONTINUED | OUTPATIENT
Start: 2017-12-14 | End: 2017-12-14 | Stop reason: HOSPADM

## 2017-12-14 RX ORDER — ONDANSETRON 2 MG/ML
4 INJECTION INTRAMUSCULAR; INTRAVENOUS ONCE AS NEEDED
Status: COMPLETED | OUTPATIENT
Start: 2017-12-14 | End: 2017-12-14

## 2017-12-14 RX ORDER — HYDROCODONE BITARTRATE AND ACETAMINOPHEN 10; 325 MG/1; MG/1
1 TABLET ORAL EVERY 6 HOURS PRN
Qty: 30 TABLET | Refills: 0 | Status: SHIPPED | OUTPATIENT
Start: 2017-12-14 | End: 2018-06-04

## 2017-12-14 RX ORDER — PROPOFOL 10 MG/ML
VIAL (ML) INTRAVENOUS AS NEEDED
Status: DISCONTINUED | OUTPATIENT
Start: 2017-12-14 | End: 2017-12-14 | Stop reason: SURG

## 2017-12-14 RX ORDER — DEXAMETHASONE SODIUM PHOSPHATE 4 MG/ML
4 INJECTION, SOLUTION INTRA-ARTICULAR; INTRALESIONAL; INTRAMUSCULAR; INTRAVENOUS; SOFT TISSUE ONCE AS NEEDED
Status: COMPLETED | OUTPATIENT
Start: 2017-12-14 | End: 2017-12-14

## 2017-12-14 RX ORDER — MIDAZOLAM HYDROCHLORIDE 1 MG/ML
2 INJECTION INTRAMUSCULAR; INTRAVENOUS
Status: DISCONTINUED | OUTPATIENT
Start: 2017-12-14 | End: 2017-12-14 | Stop reason: HOSPADM

## 2017-12-14 RX ORDER — NALOXONE HCL 0.4 MG/ML
0.04 VIAL (ML) INJECTION AS NEEDED
Status: DISCONTINUED | OUTPATIENT
Start: 2017-12-14 | End: 2017-12-14 | Stop reason: HOSPADM

## 2017-12-14 RX ORDER — HYDRALAZINE HYDROCHLORIDE 20 MG/ML
5 INJECTION INTRAMUSCULAR; INTRAVENOUS
Status: DISCONTINUED | OUTPATIENT
Start: 2017-12-14 | End: 2017-12-14 | Stop reason: HOSPADM

## 2017-12-14 RX ORDER — ONDANSETRON 2 MG/ML
INJECTION INTRAMUSCULAR; INTRAVENOUS AS NEEDED
Status: DISCONTINUED | OUTPATIENT
Start: 2017-12-14 | End: 2017-12-14 | Stop reason: SURG

## 2017-12-14 RX ORDER — IPRATROPIUM BROMIDE AND ALBUTEROL SULFATE 2.5; .5 MG/3ML; MG/3ML
3 SOLUTION RESPIRATORY (INHALATION) ONCE AS NEEDED
Status: DISCONTINUED | OUTPATIENT
Start: 2017-12-14 | End: 2017-12-14 | Stop reason: HOSPADM

## 2017-12-14 RX ADMIN — DEXAMETHASONE SODIUM PHOSPHATE 4 MG: 4 INJECTION, SOLUTION INTRAMUSCULAR; INTRAVENOUS at 09:00

## 2017-12-14 RX ADMIN — PHENAZOPYRIDINE HYDROCHLORIDE 100 MG: 100 TABLET ORAL at 06:27

## 2017-12-14 RX ADMIN — MIDAZOLAM HYDROCHLORIDE 2 MG: 1 INJECTION, SOLUTION INTRAMUSCULAR; INTRAVENOUS at 07:14

## 2017-12-14 RX ADMIN — SUGAMMADEX 200 MG: 100 INJECTION, SOLUTION INTRAVENOUS at 09:55

## 2017-12-14 RX ADMIN — ROCURONIUM BROMIDE 10 MG: 10 INJECTION INTRAVENOUS at 08:24

## 2017-12-14 RX ADMIN — MORPHINE SULFATE 7.5 MG: 10 INJECTION, SOLUTION INTRAMUSCULAR; INTRAVENOUS at 09:30

## 2017-12-14 RX ADMIN — LIDOCAINE HYDROCHLORIDE 0.5 ML: 10 INJECTION, SOLUTION EPIDURAL; INFILTRATION; INTRACAUDAL; PERINEURAL at 06:10

## 2017-12-14 RX ADMIN — ONDANSETRON 4 MG: 2 INJECTION, SOLUTION INTRAMUSCULAR; INTRAVENOUS at 12:40

## 2017-12-14 RX ADMIN — FENTANYL CITRATE 50 MCG: 50 INJECTION INTRAMUSCULAR; INTRAVENOUS at 08:50

## 2017-12-14 RX ADMIN — SODIUM CHLORIDE, POTASSIUM CHLORIDE, SODIUM LACTATE AND CALCIUM CHLORIDE 20 ML/HR: 600; 310; 30; 20 INJECTION, SOLUTION INTRAVENOUS at 06:10

## 2017-12-14 RX ADMIN — LIDOCAINE HYDROCHLORIDE 1 EACH: 40 SOLUTION TOPICAL at 08:26

## 2017-12-14 RX ADMIN — LIDOCAINE HYDROCHLORIDE 100 MG: 20 INJECTION, SOLUTION INFILTRATION; PERINEURAL at 08:22

## 2017-12-14 RX ADMIN — CEFAZOLIN SODIUM 1 G: 1 SOLUTION INTRAVENOUS at 08:35

## 2017-12-14 RX ADMIN — SODIUM CHLORIDE, POTASSIUM CHLORIDE, SODIUM LACTATE AND CALCIUM CHLORIDE: 600; 310; 30; 20 INJECTION, SOLUTION INTRAVENOUS at 10:10

## 2017-12-14 RX ADMIN — FENTANYL CITRATE 50 MCG: 50 INJECTION INTRAMUSCULAR; INTRAVENOUS at 08:44

## 2017-12-14 RX ADMIN — MORPHINE SULFATE 2.5 MG: 10 INJECTION, SOLUTION INTRAMUSCULAR; INTRAVENOUS at 09:20

## 2017-12-14 RX ADMIN — ROCURONIUM BROMIDE 30 MG: 10 INJECTION INTRAVENOUS at 08:30

## 2017-12-14 RX ADMIN — PROPOFOL 50 MG: 10 INJECTION, EMULSION INTRAVENOUS at 09:15

## 2017-12-14 RX ADMIN — ONDANSETRON HYDROCHLORIDE 4 MG: 2 SOLUTION INTRAMUSCULAR; INTRAVENOUS at 09:00

## 2017-12-14 RX ADMIN — MEPERIDINE HYDROCHLORIDE 12.5 MG: 25 INJECTION INTRAMUSCULAR; INTRAVENOUS; SUBCUTANEOUS at 10:40

## 2017-12-14 RX ADMIN — SUCCINYLCHOLINE CHLORIDE 120 MG: 20 INJECTION, SOLUTION INTRAMUSCULAR; INTRAVENOUS at 08:25

## 2017-12-14 RX ADMIN — KETOROLAC TROMETHAMINE 30 MG: 30 INJECTION, SOLUTION INTRAMUSCULAR at 10:18

## 2017-12-14 RX ADMIN — DEXAMETHASONE SODIUM PHOSPHATE 4 MG: 4 INJECTION, SOLUTION INTRAMUSCULAR; INTRAVENOUS at 07:14

## 2017-12-14 RX ADMIN — SODIUM CHLORIDE, POTASSIUM CHLORIDE, SODIUM LACTATE AND CALCIUM CHLORIDE 1000 ML: 600; 310; 30; 20 INJECTION, SOLUTION INTRAVENOUS at 06:15

## 2017-12-14 RX ADMIN — HYDROCODONE BITARTRATE AND ACETAMINOPHEN 2 TABLET: 5; 325 TABLET ORAL at 11:40

## 2017-12-14 RX ADMIN — FENTANYL CITRATE 50 MCG: 50 INJECTION INTRAMUSCULAR; INTRAVENOUS at 08:21

## 2017-12-14 RX ADMIN — CEFAZOLIN SODIUM 1 G: 1 SOLUTION INTRAVENOUS at 08:55

## 2017-12-14 RX ADMIN — FENTANYL CITRATE 50 MCG: 50 INJECTION INTRAMUSCULAR; INTRAVENOUS at 08:47

## 2017-12-14 RX ADMIN — FENTANYL CITRATE 50 MCG: 50 INJECTION INTRAMUSCULAR; INTRAVENOUS at 08:23

## 2017-12-14 RX ADMIN — ROCURONIUM BROMIDE 10 MG: 10 INJECTION INTRAVENOUS at 09:25

## 2017-12-14 RX ADMIN — IPRATROPIUM BROMIDE AND ALBUTEROL SULFATE 3 ML: 2.5; .5 SOLUTION RESPIRATORY (INHALATION) at 07:13

## 2017-12-14 RX ADMIN — HYDROMORPHONE HYDROCHLORIDE 2 MG: 2 INJECTION, SOLUTION INTRAMUSCULAR; INTRAVENOUS; SUBCUTANEOUS at 10:19

## 2017-12-14 RX ADMIN — PROPOFOL 200 MG: 10 INJECTION, EMULSION INTRAVENOUS at 08:24

## 2017-12-14 NOTE — PLAN OF CARE
Problem: Patient Care Overview (Adult)  Goal: Plan of Care Review  Outcome: Outcome(s) achieved Date Met:  12/14/17 12/14/17 5164   Coping/Psychosocial Response Interventions   Plan Of Care Reviewed With patient;spouse   Patient Care Overview   Progress improving   Outcome Evaluation   Outcome Summary/Follow up Plan patient meets discharge criteria         Problem: Perioperative Period (Adult)  Goal: Signs and Symptoms of Listed Potential Problems Will be Absent or Manageable (Perioperative Period)  Outcome: Outcome(s) achieved Date Met:  12/14/17

## 2017-12-14 NOTE — ANESTHESIA PREPROCEDURE EVALUATION
Anesthesia Evaluation     Patient summary reviewed   no history of anesthetic complications:  NPO Solid Status: > 8 hours  NPO Liquid Status: > 8 hours     Airway   Mallampati: I  TM distance: >3 FB  Neck ROM: full  Dental          Pulmonary - normal exam    breath sounds clear to auscultation  (+) a smoker (1 ppd) Current,   (-) asthma, recent URI, sleep apnea  Cardiovascular - normal exam  Exercise tolerance: excellent (>7 METS)    Rhythm: regular  Rate: normal    (-) pacemaker, hypertension, past MI, angina, cardiac stents, CABG      Neuro/Psych  (-) seizures, TIA, CVA  GI/Hepatic/Renal/Endo    (+) obesity,    (-) GERD, liver disease, no renal disease, diabetes, hypothyroidism, hyperthyroidism    Musculoskeletal     Abdominal    Substance History      OB/GYN          Other                                        Anesthesia Plan    ASA 2     general     intravenous induction   Anesthetic plan and risks discussed with patient.

## 2017-12-14 NOTE — DISCHARGE INSTRUCTIONS

## 2017-12-14 NOTE — OP NOTE
TOTAL LAPAROSCOPIC HYSTERECTOMY BILATERAL SALPINGECTOMY WITH DAVINCI SI ROBOT, CYSTO  Procedure Note    Phoebe Sawant  12/14/2017    Anesthesia: General    Staff:   Circulator: Tushar Lovett RN  Scrub Person: Taya QUEEN Jay; Conor Gillespie  Assistant: Korina Marcelo    Pre-op Diagnosis:   MENORRHAGIA    Post-op Diagnosis:     * No Diagnosis Codes entered *    Procedure:      Procedure(s):  TOTAL LAPAROSCOPIC HYSTERECTOMY BILATERAL SALPINGOOPHERECTOMY WITH DAVINCI SI ROBOT, CYSTO    Surgeon(s):  Korina Waters MD      Estimated Blood Loss: minimal    Findings: Normal uterus with both filmy and dense adhesions of adnexae to post leaf broad ligament.    Complications: none    Procedure Description: After informed consent was obtained, the patient was taken to the operating room. She was placed in the dorsal supine position. After adequate anesthesia, endotracheal, she was placed in the dorsal lithotomy position. She was prepped and draped in the usual sterile fashion for da Byron hysterectomy. A Pereyra catheter was placed aseptically, and a VCare manipulator was placed into the uterus, sutured onto the cervix for stabilization. An incision was made above the umbilicus with a knife. Fascial edges were elevated up with 2 Kochers, incised with a knife. The peritoneum was entered bluntly. A 12 mm port with a blunt trocar was placed. CO2 was insufflated. After adequate pneumoperitoneum, 8 mm ports were placed to the right and left of the umbilicus, and an assistant port, 12 mm, was placed to the right of the camera port. Insufflation was switched to the AirSeal. The patient was then placed in Trendelenburg. The robot was docked. I turned my attention to the surgeon's console. The pelvis was surveyed. The above dictated findings were noted. I then proceeded with hysterectomy and BSO. Both tubes and ovaries were stuck to the post leaf of the broad ligament with mostly filmy adhesions, few dense. These were  taken down initially. The infundibulopelvic ligaments were cauterized with bipolar cautery and cut with monopolar scissor. The round ligaments were cauterized with bipolar, cut with the monopolar scissors bilaterally. The broad ligament was opened up using blunt dissection. The anterior leaf of the broad ligament was dissected bilaterally towards the anterior aspect of the cervix, creating a bladder flap, and the bladder was pushed down off the cervix, using both sharp and blunt dissection and monopolar cautery. The posterior leaf of the broad ligament was dissected posteriorly towards the cervix. The uterine arteries were skeletonized completely bilaterally. They were cauterized at the level of the cervix and then cut with the monopolar scissors. Colpotomy was made circumferentially, and the entire cervix was removed off the vagina. The uterus was removed vaginally with the manipulator attached. Good hemostasis was noted. The vaginal cuff was closed with 0 PDS with figure-of-eight sutures. Good hemostasis was again noted. The ovaries were visually normal and hemostatic. The entire pelvis was irrigated and dried and again reinspected for hemostasis. Sherry was placed over the pelvic floor to ensure continued hemostasis. Following this, the robot was undocked. All the ports were removed. CO2 was removed from the abdomen. Pereyra catheter was removed. Cystoscopy revealed intact bladder with positive jets bilaterally as patient had been given Pyridium prior to the case. Pereyra catheter was replaced. The vagina was irrigated. I turned my attention to the patient's abdomen. At the midline camera port, superior to the umbilicus. The fascia was elevated up with Kocher clamps and closed with 0 Vicryl on a UR-6 in a running fashion, and the skin on all the ports was closed with Monocryl subcutaneously and Dermabond. The patient tolerated the procedure well. There were no complications. She was awakened from anesthesia,  transferred to the recovery room in stable condition. The sponge, lap and needle counts were correct x2.        Both tubes and ovaries were scarred down to posterior leaf of the broad ligament    Specimens:                  ID Type Source Tests Collected by Time Destination   A : Uterus, Cervix, Left and Right Fallopian Tubes, Left and Right Ovaries Tissue Uterus with Cervix, Bilateral Tubes and Ovaries TISSUE EXAM Korina Waters MD 12/14/2017 0911            Korina Waters MD     Date: 12/14/2017  Time: 10:26 AM

## 2017-12-14 NOTE — ANESTHESIA POSTPROCEDURE EVALUATION
Patient: Phoebe Sawant    Procedure Summary     Date Anesthesia Start Anesthesia Stop Room / Location    12/14/17 0819 1020 [facility] / [facility]       Procedure Diagnosis Surgeon Provider    TOTAL LAPAROSCOPIC HYSTERECTOMY BILATERAL SALPINGOOPHERECTOMY WITH DAVINCI SI ROBOT, CYSTO (Bilateral Abdomen) (MENORRHAGIA) MD GEORGE Pringle CRNA          Anesthesia Type: general  Last vitals  BP   120/71 (12/14/17 1130)   Temp   97.9 °F (36.6 °C) (12/14/17 1100)   Pulse   84 (12/14/17 1130)   Resp   16 (12/14/17 1130)     SpO2   97 % (12/14/17 1130)     Post Anesthesia Care and Evaluation    Patient location during evaluation: PACU  Patient participation: complete - patient participated  Level of consciousness: awake and alert  Pain score: 0  Pain management: adequate  Airway patency: patent  Anesthetic complications: No anesthetic complications    Cardiovascular status: acceptable and stable  Respiratory status: acceptable and unassisted  Hydration status: acceptable

## 2017-12-14 NOTE — PLAN OF CARE
Problem: Patient Care Overview (Adult)  Goal: Plan of Care Review  Outcome: Ongoing (interventions implemented as appropriate)    12/14/17 0817   Coping/Psychosocial Response Interventions   Plan Of Care Reviewed With patient   Patient Care Overview   Progress no change   Outcome Evaluation   Outcome Summary/Follow up Plan no changes in the preop holding phase

## 2017-12-14 NOTE — PLAN OF CARE
Problem: Perioperative Period (Adult)  Goal: Signs and Symptoms of Listed Potential Problems Will be Absent or Manageable (Perioperative Period)  Outcome: Ongoing (interventions implemented as appropriate)    12/14/17 9751   Perioperative Period   Problems Assessed (Perioperative Period) pain;hypothermia;hypoxia/hypoxemia;perioperative injury;situational response   Problems Present (Perioperative Period) situational response

## 2017-12-14 NOTE — PLAN OF CARE
Problem: Patient Care Overview (Adult)  Goal: Plan of Care Review  Outcome: Ongoing (interventions implemented as appropriate)    12/14/17 1058   Coping/Psychosocial Response Interventions   Plan Of Care Reviewed With patient   Patient Care Overview   Progress improving   Outcome Evaluation   Outcome Summary/Follow up Plan patient states isacc is 4/10 scale ad is tolerable, pacu dicharge criera met         Problem: Perioperative Period (Adult)  Goal: Signs and Symptoms of Listed Potential Problems Will be Absent or Manageable (Perioperative Period)  Outcome: Ongoing (interventions implemented as appropriate)

## 2017-12-19 LAB
CYTO UR: NORMAL
LAB AP CASE REPORT: NORMAL
Lab: NORMAL
PATH REPORT.FINAL DX SPEC: NORMAL
PATH REPORT.GROSS SPEC: NORMAL

## 2017-12-20 ENCOUNTER — TELEPHONE (OUTPATIENT)
Dept: OBGYN | Age: 47
End: 2017-12-20

## 2017-12-29 ENCOUNTER — OFFICE VISIT (OUTPATIENT)
Dept: OBGYN | Age: 47
End: 2017-12-29

## 2017-12-29 VITALS
BODY MASS INDEX: 36.98 KG/M2 | SYSTOLIC BLOOD PRESSURE: 122 MMHG | HEIGHT: 68 IN | DIASTOLIC BLOOD PRESSURE: 70 MMHG | HEART RATE: 68 BPM | WEIGHT: 244 LBS

## 2017-12-29 DIAGNOSIS — Z09 POSTOPERATIVE EXAMINATION: Primary | ICD-10-CM

## 2017-12-29 PROCEDURE — 99024 POSTOP FOLLOW-UP VISIT: CPT | Performed by: NURSE PRACTITIONER

## 2017-12-29 NOTE — PATIENT INSTRUCTIONS
Patient Education        Surgery: What to Expect at Home  Your Recovery  This care sheet gives you a general idea about how long it will take for you to recover from your surgery. But each person recovers at a different pace. This care sheet gives you a general idea about how long it will take for you to recover from your surgery. But each person recovers at a different pace. How can you care for yourself at home? Activity  ? · Allow your body to heal. Don't move quickly or lift anything heavy until you are feeling better. ? · Rest when you feel tired. ? · Your doctor may give you specific instructions on when you can do your normal activities again, such as driving and going back to work. ? · Be active. Walking is a good choice. Diet  ? · You can eat your normal diet when you feel well. If your stomach is upset, try bland, low-fat foods like plain rice, broiled chicken, toast, and yogurt. ? · If your bowel movements are not regular right after surgery, try to avoid constipation and straining. Drink plenty of water. Your doctor may suggest fiber, a stool softener, or a mild laxative. Medicines  ? · Your doctor will tell you if and when you can restart your medicines. He or she will also give you instructions about taking any new medicines. ? · If you take blood thinners, such as warfarin (Coumadin), clopidogrel (Plavix), or aspirin, be sure to talk to your doctor. He or she will tell you if and when to start taking those medicines again. Make sure that you understand exactly what your doctor wants you to do. ? · Be safe with medicines. Read and follow all instructions on the label. ¨ If the doctor gave you a prescription medicine for pain, take it as prescribed. ¨ If you are not taking a prescription pain medicine, ask your doctor if you can take an over-the-counter medicine. Incision care  ? If your doctor told you how to care for your cut (incision), follow your doctor's instructions.  If you did not get instructions, follow this general advice:  ? · You will have a dressing over the cut. A dressing helps the incision heal and protects it. Your doctor will tell you how to take care of this. ? · If you have strips of tape on the cut the doctor made, leave the tape on for a week or until it falls off.   ? · If you had stitches or staples, your doctor will tell you when to come back to have them removed. ? · If you have skin adhesive on the cut, leave it on until it falls off. Skin adhesive is also called liquid stitches. ? · Change the bandage every day. ? · Wash the area daily with warm water, and pat it dry. Don't use hydrogen peroxide or alcohol. They can slow healing. ? · You may cover the area with a gauze bandage if it oozes fluid or rubs against clothing. ? · You may shower 24 to 48 hours after surgery. Pat the incision dry. Don't swim or take a bath for the first 2 weeks, or until your doctor tells you it is okay. Follow-up care is a key part of your treatment and safety. Be sure to make and go to all appointments, and call your doctor if you are having problems. It's also a good idea to know your test results and keep a list of the medicines you take. When should you call for help? Call 911 anytime you think you may need emergency care. For example, call if:  ? · You passed out (lost consciousness). ? · You are short of breath. ?Call your doctor now or seek immediate medical care if:  ? · You have pain that does not get better after you take pain medicine. ? · You cannot pass stool or gas. ? · You are sick to your stomach and cannot drink fluids. ? · You have loose stitches, or your incision comes open. ? · You have signs of a blood clot in your leg (called a deep vein thrombosis), such as:  ¨ Pain in your calf, back of the knee, thigh, or groin. ¨ Redness and swelling in your leg or groin.    ? · You have signs of infection, such as:  ¨ Increased pain, swelling, warmth, or redness. ¨ Red streaks leading from the incision. ¨ Pus draining from the incision. ¨ A fever. ? · Bright red blood has soaked through your bandage. ? Watch closely for any changes in your health, and be sure to contact your doctor if you have any problems. Where can you learn more? Go to [email or web address] and sign in to your [employer] account. Enter O966 in the [employer] box to learn more about \"Surgery: What to Expect at Home. \"     If you do not have an account, please click on the \"Sign Up Now\" link. Current as of: May 12, 2017  Content Version: 11.4  © 7260-6692 Healthwise, Incorporated. Care instructions adapted under license by [facility] ([facility]). If you have questions about a medical condition or this instruction, always ask your healthcare professional. [address] any warranty or liability for your use of this information.

## 2017-12-29 NOTE — PROGRESS NOTES
Subjective:      Patient ID: Lakesha Quintero is a 50 y.o. female. HPI  Patient presents today for two week post op incision check. She is feeling well. Lakesha Quintero is a 50 y.o. female with the following history as recorded in [facility]:  Patient Active Problem List    Diagnosis Date Noted    Dyspepsia 09/30/2016    Loose stools 08/02/2016    Abdominal bloating 05/27/2016    Flatulence/gas pain/belching 05/27/2016    Alternating constipation and diarrhea 05/27/2016    Dysmenorrhea     Hydrosalpinx     Pelvic adhesive disease     Pelvic pain in female      Current Outpatient Prescriptions   Medication Sig Dispense Refill    pravastatin (PRAVACHOL) 40 MG tablet       dicyclomine (BENTYL) 10 MG capsule Take 1 capsule by mouth 3 times daily (before meals) 270 capsule 3     No current facility-administered medications for this visit.       Allergies: Latex  Past Medical History:   Diagnosis Date    Arthritis     Diarrhea     Hyperlipidemia     no meds; just watching it     Past Surgical History:   Procedure Laterality Date    COLONOSCOPY  2015    Chelsea Burton    COLONOSCOPY N/A 12/12/2016    Dr Ekaterina Roque yr recall    [place] EGD TRANSORAL BIOPSY SINGLE/MULTIPLE N/A 12/12/2016    Dr Ekaterina Norwood Fuentes-Gastritis    SALPINGECTOMY Bilateral 5/11/2016    EXAM UNDER ANESTHESIA; DIAGNOSTIC LAPAROSCOPY, RIGHT SALPINGECTOMY, WITH LYSIS OF ADHESIONS, PAPSMEAR  performed by Vinay Bey MD at [facility]      UPPER GASTROINTESTINAL ENDOSCOPY  12/12/2016     Family History   Problem Relation Age of Onset    Other Mother      intestinal problems    Colon Polyps Mother     Cancer Father      esophogus    Diabetes Father     Heart Failure Father     Esophageal Cancer Father     Stomach Cancer Maternal Grandmother     Heart Failure Brother     Colon Polyps Maternal Grandfather     Colon Cancer Neg Hx     Liver Cancer Neg Hx     Liver Disease Neg Hx    

## 2018-01-31 ENCOUNTER — OFFICE VISIT (OUTPATIENT)
Dept: OBGYN | Age: 48
End: 2018-01-31

## 2018-01-31 VITALS
HEART RATE: 104 BPM | SYSTOLIC BLOOD PRESSURE: 128 MMHG | DIASTOLIC BLOOD PRESSURE: 70 MMHG | WEIGHT: 247 LBS | HEIGHT: 68 IN | BODY MASS INDEX: 37.44 KG/M2 | TEMPERATURE: 97.9 F

## 2018-01-31 DIAGNOSIS — Z09 POSTOPERATIVE FOLLOW-UP: Primary | ICD-10-CM

## 2018-01-31 PROCEDURE — 99024 POSTOP FOLLOW-UP VISIT: CPT | Performed by: OBSTETRICS & GYNECOLOGY

## 2018-01-31 RX ORDER — ESTERIFIED ESTROGEN AND METHYLTESTOSTERONE .625; 1.25 MG/1; MG/1
1 TABLET ORAL DAILY
Qty: 30 TABLET | Refills: 5 | Status: SHIPPED | OUTPATIENT
Start: 2018-01-31 | End: 2018-04-24

## 2018-01-31 ASSESSMENT — ENCOUNTER SYMPTOMS
RESPIRATORY NEGATIVE: 1
EYES NEGATIVE: 1
GASTROINTESTINAL NEGATIVE: 1

## 2018-01-31 NOTE — PATIENT INSTRUCTIONS
Patient Education        Laparoscopically Assisted Vaginal Hysterectomy: What to Expect at [address] [place] [address] can expect to feel better and stronger each day, although you may need pain medicine for a week or two. You may get tired easily or have less energy than usual. This may last for several weeks after surgery. You will probably notice that your belly is swollen and puffy. This is common. The swelling will take several weeks to go down. It may take about 4 to 6 weeks to fully recover. The recovery time may be less for some patients. You may have some light vaginal bleeding. Don't have sex until the doctor says it is okay. Don't douche or put anything into your vagina, such as a tampon, until your doctor says it is okay. It is important to avoid lifting while you are recovering so that you can heal.  This care sheet gives you a general idea about how long it will take for you to recover. But each person recovers at a different pace. Follow the steps below to get better as quickly as possible. How can you care for yourself at home? Activity  ? · Rest when you feel tired. Getting enough sleep will help you recover. ? · Try to walk each day. Start by walking a little more than you did the day before. Bit by bit, increase the amount you walk. Walking boosts blood flow and helps prevent pneumonia and constipation. ? · Avoid lifting anything that would make you strain. This may include heavy grocery bags and milk containers, a heavy briefcase or backpack, cat litter or dog food bags, a vacuum , or a child. ? · Avoid strenuous activities, such as biking, jogging, weight lifting, or aerobic exercise, until your doctor says it is okay. ? · You may shower. Pat the cut (incision) dry. Do not take a bath for the first 2 weeks, or until your doctor tells you it is okay. ? · Ask your doctor when you can drive again. ? · You will probably need to take about 2 weeks off from work.  It depends on the type of work you do and how you feel. ? · Your doctor will tell you when you can have sex again. Diet  ? · You can eat your normal diet. If your stomach is upset, try bland, low-fat foods like plain rice, broiled chicken, toast, and yogurt. ? · Drink plenty of fluids (unless your doctor tells you not to). ? · You may notice that your bowel movements are not regular right after your surgery. This is common. Try to avoid constipation and straining with bowel movements. You may want to take a fiber supplement every day. If you have not had a bowel movement after a couple of days, ask your doctor about taking a mild laxative. Medicines  ? · Your doctor will tell you if and when you can restart your medicines. He or she will also give you instructions about taking any new medicines. ? · If you take blood thinners, such as warfarin (Coumadin), clopidogrel (Plavix), or aspirin, be sure to talk to your doctor. He or she will tell you if and when to start taking those medicines again. Make sure that you understand exactly what your doctor wants you to do. ? · Be safe with medicines. Take pain medicines exactly as directed. ¨ If the doctor gave you a prescription medicine for pain, take it as prescribed. ¨ If you are not taking a prescription pain medicine, ask your doctor if you can take an over-the-counter medicine. ? · If you think your pain medicine is making you sick to your stomach:  ¨ Take your medicine after meals (unless your doctor has told you not to). ¨ Ask your doctor for a different pain medicine. ? · If your doctor prescribed antibiotics, take them as directed. Do not stop taking them just because you feel better. You need to take the full course of antibiotics. Incision care  ? · You may have stitches over the cuts (incisions) the doctor made in your belly. If you have strips of tape on the incisions the doctor made, leave the tape on for a week or until it falls off.  Or follow your you learn more? Go to [email or web address] and sign in to your [employer] account. Enter J474 in the [employer] box to learn more about \"Laparoscopically Assisted Vaginal Hysterectomy: What to Expect at Home. \"     If you do not have an account, please click on the \"Sign Up Now\" link. Current as of: October 13, 2016  Content Version: 11.5  © 3152-7497 Healthwise, Incorporated. Care instructions adapted under license by [facility] ([facility]). If you have questions about a medical condition or this instruction, always ask your healthcare professional. [address] any warranty or liability for your use of this information.

## 2018-01-31 NOTE — PROGRESS NOTES
Subjective:      Patient ID: Tye Dobbs is a 50 y.o. female. Patient presents today for a post op visit. Patient had a [address] BSO, cysto with Ollivan Scott on 12/14/17. HPI  Pt is here for a 6 week post op check. Pt is having weight gain and low sex drive since surgery. Tye Dobbs is a 50 y.o. female with the following history as recorded in [facility]:  Patient Active Problem List    Diagnosis Date Noted    Dyspepsia 09/30/2016    Loose stools 08/02/2016    Abdominal bloating 05/27/2016    Flatulence/gas pain/belching 05/27/2016    Alternating constipation and diarrhea 05/27/2016    Dysmenorrhea     Hydrosalpinx     Pelvic adhesive disease     Pelvic pain in female      Current Outpatient Prescriptions   Medication Sig Dispense Refill    pravastatin (PRAVACHOL) 40 MG tablet       dicyclomine (BENTYL) 10 MG capsule Take 1 capsule by mouth 3 times daily (before meals) 270 capsule 3     No current facility-administered medications for this visit.       Allergies: Latex  Past Medical History:   Diagnosis Date    Arthritis     Diarrhea     Hyperlipidemia     no meds; just watching it     Past Surgical History:   Procedure Laterality Date    COLONOSCOPY  2015    Psychiatric    COLONOSCOPY N/A 12/12/2016    Dr Ana Lefort Darylene Sevin yr recall    HYSTERECTOMY, VAGINAL      [facility] BSO    NM EGD TRANSORAL BIOPSY SINGLE/MULTIPLE N/A 12/12/2016    Dr Ana Lefort Fuentes-Gastritis    SALPINGECTOMY Bilateral 5/11/2016    EXAM UNDER ANESTHESIA; DIAGNOSTIC LAPAROSCOPY, RIGHT SALPINGECTOMY, WITH LYSIS OF ADHESIONS, PAPSMEAR  performed by Rishi Prasad MD at [address] [facility]      UPPER GASTROINTESTINAL ENDOSCOPY  12/12/2016     Family History   Problem Relation Age of Onset    Other Mother      intestinal problems    Colon Polyps Mother     Cancer Father      esophogus    Diabetes Father     Heart Failure Father     Esophageal Cancer Father     Stomach Cancer Maternal

## 2018-04-02 ENCOUNTER — TELEPHONE (OUTPATIENT)
Dept: OBGYN | Age: 48
End: 2018-04-02

## 2018-04-03 RX ORDER — ESTRADIOL 0.5 MG/1
0.5 TABLET ORAL DAILY
Qty: 30 TABLET | Refills: 11 | Status: SHIPPED | OUTPATIENT
Start: 2018-04-03 | End: 2020-07-30

## 2018-04-24 ENCOUNTER — TELEPHONE (OUTPATIENT)
Dept: OBGYN | Age: 48
End: 2018-04-24

## 2018-04-24 RX ORDER — ESTRADIOL 0.1 MG/G
1 CREAM VAGINAL
Qty: 1 TUBE | Refills: 5 | Status: SHIPPED | OUTPATIENT
Start: 2018-04-26 | End: 2020-07-30

## 2018-06-04 ENCOUNTER — OFFICE VISIT (OUTPATIENT)
Dept: OTOLARYNGOLOGY | Facility: CLINIC | Age: 48
End: 2018-06-04

## 2018-06-04 VITALS
TEMPERATURE: 98 F | DIASTOLIC BLOOD PRESSURE: 81 MMHG | SYSTOLIC BLOOD PRESSURE: 115 MMHG | RESPIRATION RATE: 20 BRPM | HEIGHT: 68 IN | WEIGHT: 254 LBS | HEART RATE: 85 BPM | BODY MASS INDEX: 38.49 KG/M2

## 2018-06-04 DIAGNOSIS — Z72.0 TOBACCO ABUSE DISORDER: ICD-10-CM

## 2018-06-04 DIAGNOSIS — L57.0 ACTINIC KERATOSIS: Primary | ICD-10-CM

## 2018-06-04 PROCEDURE — 99203 OFFICE O/P NEW LOW 30 MIN: CPT | Performed by: OTOLARYNGOLOGY

## 2018-06-04 RX ORDER — DOXEPIN HYDROCHLORIDE 50 MG/G
CREAM TOPICAL
Refills: 1 | COMMUNITY
Start: 2018-05-09 | End: 2022-12-07

## 2018-06-04 RX ORDER — DICYCLOMINE HYDROCHLORIDE 10 MG/1
10 CAPSULE ORAL
COMMUNITY
Start: 2017-09-06 | End: 2022-05-23

## 2018-06-04 RX ORDER — LIDOCAINE 50 MG/G
OINTMENT TOPICAL
Refills: 1 | COMMUNITY
Start: 2018-05-09 | End: 2022-12-07

## 2018-06-04 RX ORDER — PRAVASTATIN SODIUM 40 MG
40 TABLET ORAL DAILY
COMMUNITY
Start: 2018-05-24

## 2018-06-04 RX ORDER — FLUOROURACIL 50 MG/G
CREAM TOPICAL 2 TIMES DAILY
Qty: 30 G | Refills: 0 | Status: SHIPPED | OUTPATIENT
Start: 2018-06-04 | End: 2018-06-18

## 2018-06-04 NOTE — PATIENT INSTRUCTIONS
##### TOBACCO CESSATION #####   IF YOU SMOKE OR USE TOBACCO PLEASE READ THE FOLLOWING:    Why is smoking bad for me?  Smoking increases the risk of heart disease, lung disease, vascular disease, stroke, and cancer.     If you smoke, STOP!    If you would like more information on quitting smoking, please visit the [employer] website: [email or web address]   This link will provide additional resources including the QUIT line and the Beat the Pack support groups.     For more information:    Quit Now [place]  1-800-QUIT-NOW  https://[place].quitlogix.org/en-[place]/  ###### BMI  #####   MyPlate from [employer]  The general, healthful diet is based on the 2010 Dietary Guidelines for Americans. The amount of food you need to eat from each food group depends on your age, sex, and level of physical activity and can be individualized by a dietitian. Go to ChooseMyPlate.gov for more information.  What do I need to know about the MyPlate plan?  · Enjoy your food, but eat less.  · Avoid oversized portions.  ¨ ½ of your plate should include fruits and vegetables.  ¨ ¼ of your plate should be grains.  ¨ ¼ of your plate should be protein.  Grains   · Make at least half of your grains whole grains.  · For a 2,000 calorie daily food plan, eat 6 oz every day.  · 1 oz is about 1 slice bread, 1 cup cereal, or ½ cup cooked rice, cereal, or pasta.  Vegetables   · Make half your plate fruits and vegetables.  · For a 2,000 calorie daily food plan, eat 2½ cups every day.  · 1 cup is about 1 cup raw or cooked vegetables or vegetable juice or 2 cups raw leafy greens.  Fruits   · Make half your plate fruits and vegetables.  · For a 2,000 calorie daily food plan, eat 2 cups every day.  · 1 cup is about 1 cup fruit or 100% fruit juice or ½ cup dried fruit.  Protein   · For a 2,000 calorie daily food plan, eat 5½ oz every day.  · 1 oz is about 1 oz meat, poultry, or fish, ¼ cup cooked beans, 1 egg, 1 Tbsp  peanut butter, or ½ oz nuts or seeds.  Dairy   · Switch to fat-free or low-fat (1%) milk.  · For a 2,000 calorie daily food plan, eat 3 cups every day.  · 1 cup is about 1 cup milk or yogurt or soy milk (soy beverage), 1½ oz natural cheese, or 2 oz processed cheese.  Fats, Oils, and Empty Calories   · Only small amounts of oils are recommended.  · Empty calories are calories from solid fats or added sugars.  · Compare sodium in foods like soup, bread, and frozen meals. Choose the foods with lower numbers.  · Drink water instead of sugary drinks.  What foods can I eat?  Grains   Whole grains such as whole wheat, quinoa, millet, and bulgur. Bread, rolls, and pasta made from whole grains. Brown or wild rice. Hot or cold cereals made from whole grains and without added sugar.  Vegetables   All fresh vegetables, especially fresh red, dark green, or orange vegetables. Peas and beans. Low-sodium frozen or canned vegetables prepared without added salt. Low-sodium vegetable juices.  Fruits   All fresh, frozen, and dried fruits. Canned fruit packed in water or fruit juice without added sugar. Fruit juices without added sugar.  Meats and Other Protein Sources   Boiled, baked, or grilled lean meat trimmed of fat. Skinless poultry. Fresh seafood and shellfish. Canned seafood packed in water. Unsalted nuts and unsalted nut butters. Tofu. Dried beans and pea. Eggs.  Dairy   Low-fat or fat-free milk, yogurt, and cheeses.  Sweets and Desserts   Frozen desserts made from low-fat milk.  Fats and Oils   Olive, peanut, and canola oils and margarine. Salad dressing and mayonnaise made from these oils.  Other   Soups and casseroles made from allowed ingredients and without added fat or salt.  The items listed above may not be a complete list of recommended foods or beverages. Contact your dietitian for more options.   What foods are not recommended?  Grains   Sweetened, low-fiber cereals. Packaged baked goods. Snack crackers and chips.  Cheese crackers, butter crackers, and biscuits. Frozen waffles, sweet breads, doughnuts, pastries, packaged baking mixes, pancakes, cakes, and cookies.  Vegetables   Regular canned or frozen vegetables or vegetables prepared with salt. Canned tomatoes. Canned tomato sauce. Fried vegetables. Vegetables in cream sauce or cheese sauce.  Fruits   Fruits packed in syrup or made with added sugar.  Meats and Other Protein Sources   Marbled or fatty meats such as ribs. Poultry with skin. Fried meats, poultry, eggs, or fish. Sausages, hot dogs, and deli meats such as pastrami, bologna, or salami.  Dairy   Whole milk, cream, cheeses made from whole milk, sour cream. Ice cream or yogurt made from whole milk or with added sugar.  Beverages   For adults, no more than one alcoholic drink per day. Regular soft drinks or other sugary beverages. Juice drinks.  Sweets and Desserts   Sugary or fatty desserts, candy, and other sweets.  Fats and Oils   Solid shortening or partially hydrogenated oils. Solid margarine. Margarine that contains trans fats. Butter.  The items listed above may not be a complete list of foods and beverages to avoid. Contact your dietitian for more information.   This information is not intended to replace advice given to you by your health care provider. Make sure you discuss any questions you have with your health care provider.  Document Released: 01/06/2009 Document Revised: 05/25/2017 Document Reviewed: 11/26/2014  [employer] Interactive Patient Education © 2017 [employer]     Calorie Counting for Weight Loss  Calories are units of energy. Your body needs a certain amount of calories from food to keep you going throughout the day. When you eat more calories than your body needs, your body stores the extra calories as fat. When you eat fewer calories than your body needs, your body burns fat to get the energy it needs.  Calorie counting means keeping track of how many calories you eat and drink each day.  Calorie counting can be helpful if you need to lose weight. If you make sure to eat fewer calories than your body needs, you should lose weight. Ask your health care provider what a healthy weight is for you.  For calorie counting to work, you will need to eat the right number of calories in a day in order to lose a healthy amount of weight per week. A dietitian can help you determine how many calories you need in a day and will give you suggestions on how to reach your calorie goal.  · A healthy amount of weight to lose per week is usually 1-2 lb (0.5-0.9 kg). This usually means that your daily calorie intake should be reduced by 500-750 calories.  · Eating 1,200 - 1,500 calories per day can help most women lose weight.  · Eating 1,500 - 1,800 calories per day can help most men lose weight.  What is my plan?  My goal is to have __________ calories per day.  If I have this many calories per day, I should lose around __________ pounds per week.  What do I need to know about calorie counting?  In order to meet your daily calorie goal, you will need to:  · Find out how many calories are in each food you would like to eat. Try to do this before you eat.  · Decide how much of the food you plan to eat.  · Write down what you ate and how many calories it had. Doing this is called keeping a food log.  To successfully lose weight, it is important to balance calorie counting with a healthy lifestyle that includes regular activity. Aim for 150 minutes of moderate exercise (such as walking) or 75 minutes of vigorous exercise (such as running) each week.  Where do I find calorie information?     The number of calories in a food can be found on a Nutrition Facts label. If a food does not have a Nutrition Facts label, try to look up the calories online or ask your dietitian for help.  Remember that calories are listed per serving. If you choose to have more than one serving of a food, you will have to multiply the calories per  "serving by the amount of servings you plan to eat. For example, the label on a package of bread might say that a serving size is 1 slice and that there are 90 calories in a serving. If you eat 1 slice, you will have eaten 90 calories. If you eat 2 slices, you will have eaten 180 calories.  How do I keep a food log?  Immediately after each meal, record the following information in your food log:  · What you ate. Don't forget to include toppings, sauces, and other extras on the food.  · How much you ate. This can be measured in cups, ounces, or number of items.  · How many calories each food and drink had.  · The total number of calories in the meal.  Keep your food log near you, such as in a small notebook in your pocket, or use a mobile gila or website. Some programs will calculate calories for you and show you how many calories you have left for the day to meet your goal.  What are some calorie counting tips?  · Use your calories on foods and drinks that will fill you up and not leave you hungry:  ¨ Some examples of foods that fill you up are nuts and nut butters, vegetables, lean proteins, and high-fiber foods like whole grains. High-fiber foods are foods with more than 5 g fiber per serving.  ¨ Drinks such as sodas, specialty coffee drinks, alcohol, and juices have a lot of calories, yet do not fill you up.  · Eat nutritious foods and avoid empty calories. Empty calories are calories you get from foods or beverages that do not have many vitamins or protein, such as candy, sweets, and soda. It is better to have a nutritious high-calorie food (such as an avocado) than a food with few nutrients (such as a bag of chips).  · Know how many calories are in the foods you eat most often. This will help you calculate calorie counts faster.  · Pay attention to calories in drinks. Low-calorie drinks include water and unsweetened drinks.  · Pay attention to nutrition labels for \"low fat\" or \"fat free\" foods. These foods " sometimes have the same amount of calories or more calories than the full fat versions. They also often have added sugar, starch, or salt, to make up for flavor that was removed with the fat.  · Find a way of tracking calories that works for you. Get creative. Try different apps or programs if writing down calories does not work for you.  What are some portion control tips?  · Know how many calories are in a serving. This will help you know how many servings of a certain food you can have.  · Use a measuring cup to measure serving sizes. You could also try weighing out portions on a kitchen scale. With time, you will be able to estimate serving sizes for some foods.  · Take some time to put servings of different foods on your favorite plates, bowls, and cups so you know what a serving looks like.  · Try not to eat straight from a bag or box. Doing this can lead to overeating. Put the amount you would like to eat in a cup or on a plate to make sure you are eating the right portion.  · Use smaller plates, glasses, and bowls to prevent overeating.  · Try not to multitask (for example, watch TV or use your computer) while eating. If it is time to eat, sit down at a table and enjoy your food. This will help you to know when you are full. It will also help you to be aware of what you are eating and how much you are eating.  What are tips for following this plan?  Reading food labels   · Check the calorie count compared to the serving size. The serving size may be smaller than what you are used to eating.  · Check the source of the calories. Make sure the food you are eating is high in vitamins and protein and low in saturated and trans fats.  Shopping   · Read nutrition labels while you shop. This will help you make healthy decisions before you decide to purchase your food.  · Make a grocery list and stick to it.  Cooking   · Try to cook your favorite foods in a healthier way. For example, try baking instead of  frying.  · Use low-fat dairy products.  Meal planning   · Use more fruits and vegetables. Half of your plate should be fruits and vegetables.  · Include lean proteins like poultry and fish.  How do I count calories when eating out?  · Ask for smaller portion sizes.  · Consider sharing an entree and sides instead of getting your own entree.  · If you get your own entree, eat only half. Ask for a box at the beginning of your meal and put the rest of your entree in it so you are not tempted to eat it.  · If calories are listed on the menu, choose the lower calorie options.  · Choose dishes that include vegetables, fruits, whole grains, low-fat dairy products, and lean protein.  · Choose items that are boiled, broiled, grilled, or steamed. Stay away from items that are buttered, battered, fried, or served with cream sauce. Items labeled “crispy” are usually fried, unless stated otherwise.  · Choose water, low-fat milk, unsweetened iced tea, or other drinks without added sugar. If you want an alcoholic beverage, choose a lower calorie option such as a glass of wine or light beer.  · Ask for dressings, sauces, and syrups on the side. These are usually high in calories, so you should limit the amount you eat.  · If you want a salad, choose a garden salad and ask for grilled meats. Avoid extra toppings like jacques, cheese, or fried items. Ask for the dressing on the side, or ask for olive oil and vinegar or lemon to use as dressing.  · Estimate how many servings of a food you are given. For example, a serving of cooked rice is ½ cup or about the size of half a baseball. Knowing serving sizes will help you be aware of how much food you are eating at restaurants. The list below tells you how big or small some common portion sizes are based on everyday objects:  ¨ 1 oz--4 stacked dice.  ¨ 3 oz--1 deck of cards.  ¨ 1 tsp--1 die.  ¨ 1 Tbsp--½ a ping-pong ball.  ¨ 2 Tbsp--1 ping-pong ball.  ¨ ½ cup--½ baseball.  ¨ 1 cup--1  baseball.  Summary  · Calorie counting means keeping track of how many calories you eat and drink each day. If you eat fewer calories than your body needs, you should lose weight.  · A healthy amount of weight to lose per week is usually 1-2 lb (0.5-0.9 kg). This usually means reducing your daily calorie intake by 500-750 calories.  · The number of calories in a food can be found on a Nutrition Facts label. If a food does not have a Nutrition Facts label, try to look up the calories online or ask your dietitian for help.  · Use your calories on foods and drinks that will fill you up, and not on foods and drinks that will leave you hungry.  · Use smaller plates, glasses, and bowls to prevent overeating.  This information is not intended to replace advice given to you by your health care provider. Make sure you discuss any questions you have with your health care provider.  Document Released: 12/18/2006 Document Revised: 11/17/2017 Document Reviewed: 11/17/2017  [employer] Interactive Patient Education © 2017 [employer]     Exercising to Lose Weight  Exercising can help you to lose weight. In order to lose weight through exercise, you need to do vigorous-intensity exercise. You can tell that you are exercising with vigorous intensity if you are breathing very hard and fast and cannot hold a conversation while exercising.  Moderate-intensity exercise helps to maintain your current weight. You can tell that you are exercising at a moderate level if you have a higher heart rate and faster breathing, but you are still able to hold a conversation.  How often should I exercise?  Choose an activity that you enjoy and set realistic goals. Your health care provider can help you to make an activity plan that works for you. Exercise regularly as directed by your health care provider. This may include:  · Doing resistance training twice each week, such as:  ¨ Push-ups.  ¨ Sit-ups.  ¨ Lifting weights.  ¨ Using resistance  bands.  · Doing a given intensity of exercise for a given amount of time. Choose from these options:  ¨ 150 minutes of moderate-intensity exercise every week.  ¨ 75 minutes of vigorous-intensity exercise every week.  ¨ A mix of moderate-intensity and vigorous-intensity exercise every week.  Children, pregnant women, people who are out of shape, people who are overweight, and older adults may need to consult a health care provider for individual recommendations. If you have any sort of medical condition, be sure to consult your health care provider before starting a new exercise program.  What are some activities that can help me to lose weight?  · Walking at a rate of at least 4.5 miles an hour.  · Jogging or running at a rate of 5 miles per hour.  · Biking at a rate of at least 10 miles per hour.  · Lap swimming.  · Roller-skating or in-line skating.  · Cross-country skiing.  · Vigorous competitive sports, such as football, basketball, and soccer.  · Jumping rope.  · Aerobic dancing.  How can I be more active in my day-to-day activities?  · Use the stairs instead of the elevator.  · Take a walk during your lunch break.  · If you drive, park your car farther away from work or school.  · If you take public transportation, get off one stop early and walk the rest of the way.  · Make all of your phone calls while standing up and walking around.  · Get up, stretch, and walk around every 30 minutes throughout the day.  What guidelines should I follow while exercising?  · Do not exercise so much that you hurt yourself, feel dizzy, or get very short of breath.  · Consult your health care provider prior to starting a new exercise program.  · Wear comfortable clothes and shoes with good support.  · Drink plenty of water while you exercise to prevent dehydration or heat stroke. Body water is lost during exercise and must be replaced.  · Work out until you breathe faster and your heart beats faster.  This information is not  intended to replace advice given to you by your health care provider. Make sure you discuss any questions you have with your health care provider.  Document Released: 01/20/2012 Document Revised: 05/25/2017 Document Reviewed: 05/21/2015  [employer] Interactive Patient Education © 2017 [employer]

## 2018-06-04 NOTE — PROGRESS NOTES
CC:   Chief Complaint   Patient presents with   • Skin Lesion       HPI: Phoebe Sawant is a 47 y.o. female who reports a skin lesion on the right nasal tip.  This lesion has been present for >1 year.  The lesion has changed. She reports dry, flaky.  Nothing makes the lesion better or worse.  A biopsy has not been performed.    Prior history of skin cancer: none    Dermatologist: PCP      PFSH:  Past Medical History:   Diagnosis Date   • Abnormal bleeding in menstrual cycle    • Arthritis    • IBS (irritable bowel syndrome)    • MRSA infection     12 yrs ago, on back   • Neck pain    • Pelvic pain      Past Surgical History:   Procedure Laterality Date   • LAPAROSCOPIC TUBAL LIGATION     • SALPINGECTOMY      right tube removed   • TONSILLECTOMY     • TOTAL LAPAROSCOPIC HYSTERECTOMY SALPINGO OOPHORECTOMY Bilateral 12/14/2017    Procedure: TOTAL LAPAROSCOPIC HYSTERECTOMY BILATERAL SALPINGOOPHERECTOMY WITH DAVINCI SI ROBOT, CYSTO;  Surgeon: Korina Waters MD;  Location: [facility];  Service:      History reviewed. No pertinent family history.  Social History   Substance Use Topics   • Smoking status: Current Every Day Smoker     Packs/day: 1.00     Years: 37.00     Types: Cigarettes   • Smokeless tobacco: Never Used   • Alcohol use No     Allergies:  Latex    Current Outpatient Prescriptions:   •  dicyclomine (BENTYL) 10 MG capsule, Take 10 mg by mouth., Disp: , Rfl:   •  Doxepin HCl 5 % cream, , Disp: , Rfl: 1  •  lidocaine (XYLOCAINE) 5 % ointment, , Disp: , Rfl: 1  •  ondansetron ODT (ZOFRAN ODT) 8 MG disintegrating tablet, Take 1 tablet by mouth Every 8 (Eight) Hours As Needed for Nausea or Vomiting., Disp: 15 tablet, Rfl: 0  •  pravastatin (PRAVACHOL) 40 MG tablet, , Disp: , Rfl:   •  fluorouracil (EFUDEX) 5 % cream, Apply  topically 2 (Two) Times a Day for 14 days. 6% compounded, Disp: 30 g, Rfl: 0    ROS:  Review of Systems   Constitutional: Negative for activity change, appetite change, chills, fatigue,  "fever and unexpected weight change.   HENT: Negative for congestion, dental problem, facial swelling and nosebleeds.    Eyes: Negative for discharge and redness.   Musculoskeletal: Positive for arthralgias.   Skin: Negative for color change, pallor and rash.   Hematological: Negative for adenopathy. Does not bruise/bleed easily.   All other systems reviewed and are negative.      PE:  /81   Pulse 85   Temp 98 °F (36.7 °C)   Resp 20   Ht 172.7 cm (68\")   Wt 115 kg (254 lb)   BMI 38.62 kg/m²   Physical Exam   Constitutional: She is oriented to person, place, and time. She appears well-developed and well-nourished. She is cooperative. No distress.   HENT:   Head: Normocephalic and atraumatic.   Right Ear: External ear normal.   Left Ear: External ear normal.   Nose: Nose normal. No nasal deformity.   Mouth/Throat: Uvula is midline, oropharynx is clear and moist and mucous membranes are normal.   Eyes: Conjunctivae, EOM and lids are normal. Pupils are equal, round, and reactive to light. Right eye exhibits no discharge. Left eye exhibits no discharge. No scleral icterus.   Neck: Normal range of motion and phonation normal. Neck supple. No tracheal deviation present.   Pulmonary/Chest: Effort normal. No stridor. No respiratory distress.   Musculoskeletal: Normal range of motion. She exhibits no edema or deformity.   Lymphadenopathy:     She has no cervical adenopathy.   Neurological: She is alert and oriented to person, place, and time. She has normal strength. No cranial nerve deficit. Coordination normal.   Skin: Skin is warm and dry. Lesion noted. No rash noted. She is not diaphoretic. No erythema. No pallor.   Right nasal tip with erythematous, rough lesion c/w AK   Psychiatric: She has a normal mood and affect. Her speech is normal and behavior is normal. Judgment and thought content normal. Cognition and memory are normal.   Nursing note and vitals reviewed.            Assessment:  1. Actinic keratosis  "   2. BMI 38.0-38.9,adult    3. Tobacco abuse disorder        Plan:    We have discussed observation vs cryotherapy vs topical chemotherapy cream. She has elected to start topical chemotherapy BID for 2 weeks. Follow-up in 4 months.  If lesion persists on follow-up, we will consider biopsy. Call for any problems or worsening symptoms.     QUALITY MEASURES    Body Mass Index Screening and Follow-Up Plan  Body mass index is 38.62 kg/m².  Patient's Body mass index is 38.62 kg/m². BMI is above normal parameters. Recommendations include: educational material.    Tobacco Use: Screening and Cessation Intervention  Smoking status: Current Every Day Smoker                                                   Packs/day: 1.00      Years: 37.00        Types: Cigarettes  Smokeless tobacco: Never Used                        Smoking cessation information given in after visit summary.      Return in about 4 months (around 10/4/2018), or if symptoms worsen or fail to improve, for Recheck.      Nomi Cardenas MD   06/04/2018  2:27 PM

## 2018-09-17 ENCOUNTER — OFFICE VISIT (OUTPATIENT)
Dept: OTOLARYNGOLOGY | Facility: CLINIC | Age: 48
End: 2018-09-17

## 2018-09-17 VITALS
BODY MASS INDEX: 38.49 KG/M2 | HEIGHT: 68 IN | DIASTOLIC BLOOD PRESSURE: 76 MMHG | WEIGHT: 254 LBS | SYSTOLIC BLOOD PRESSURE: 135 MMHG | HEART RATE: 80 BPM | TEMPERATURE: 98 F | RESPIRATION RATE: 20 BRPM

## 2018-09-17 DIAGNOSIS — L57.0 ACTINIC KERATOSIS: Primary | ICD-10-CM

## 2018-09-17 PROCEDURE — 99212 OFFICE O/P EST SF 10 MIN: CPT | Performed by: OTOLARYNGOLOGY

## 2018-09-17 NOTE — PROGRESS NOTES
PRIMARY CARE PROVIDER: Mildred Wasserman MD  REFERRING PROVIDER: No ref. provider found    Chief Complaint   Patient presents with   • Follow-up     NASAL TIP LESION       Subjective   History of Present Illness:  Phoebe Sawant is a  48 y.o. female returns for follow-up regarding a skin lesion of the right nasal tip.  This had been present for over 1 year.  It had changed.  He become dry and flaky.  When I saw her on 06/04/2018, this is treated with topical chemotherapy cream for 2 weeks.  She reports this is completely resolved after the initial inflammation from the treatment.  She denies any recurrence of the lesion.  She denies any lymphadenopathy or new lesions within the head and neck.    Review of Systems:  Review of Systems   HENT: Negative for congestion.    Skin: Negative for color change, pallor, rash and wound.   Neurological: Negative for facial asymmetry.   Hematological: Negative for adenopathy. Does not bruise/bleed easily.       Past History:  Past Medical History:   Diagnosis Date   • Abnormal bleeding in menstrual cycle    • Arthritis    • IBS (irritable bowel syndrome)    • MRSA infection     12 yrs ago, on back   • Neck pain    • Pelvic pain      Past Surgical History:   Procedure Laterality Date   • LAPAROSCOPIC TUBAL LIGATION     • SALPINGECTOMY      right tube removed   • TONSILLECTOMY     • TOTAL LAPAROSCOPIC HYSTERECTOMY SALPINGO OOPHORECTOMY Bilateral 12/14/2017    Procedure: TOTAL LAPAROSCOPIC HYSTERECTOMY BILATERAL SALPINGOOPHERECTOMY WITH DAVINCI SI ROBOT, CYSTO;  Surgeon: Korina Waters MD;  Location: [facility] OR;  Service:      History reviewed. No pertinent family history.  Social History   Substance Use Topics   • Smoking status: Current Every Day Smoker     Packs/day: 1.00     Years: 37.00     Types: Cigarettes   • Smokeless tobacco: Never Used   • Alcohol use No     Allergies:  Latex    Current Outpatient Prescriptions:   •  dicyclomine (BENTYL) 10 MG capsule, Take 10 mg by  mouth., Disp: , Rfl:   •  Doxepin HCl 5 % cream, , Disp: , Rfl: 1  •  lidocaine (XYLOCAINE) 5 % ointment, , Disp: , Rfl: 1  •  ondansetron ODT (ZOFRAN ODT) 8 MG disintegrating tablet, Take 1 tablet by mouth Every 8 (Eight) Hours As Needed for Nausea or Vomiting., Disp: 15 tablet, Rfl: 0  •  pravastatin (PRAVACHOL) 40 MG tablet, , Disp: , Rfl:       Objective     Vital Signs:  Temp:  [98 °F (36.7 °C)] 98 °F (36.7 °C)  Heart Rate:  [80] 80  Resp:  [20] 20  BP: (135)/(76) 135/76    Physical Exam:  Physical Exam   Constitutional: She is oriented to person, place, and time. She appears well-developed and well-nourished. She is cooperative. No distress.   HENT:   Head: Normocephalic and atraumatic.       Right Ear: External ear normal.   Left Ear: External ear normal.   Nose: Nose normal.   Mouth/Throat: Oropharynx is clear and moist.   Eyes: Pupils are equal, round, and reactive to light. Conjunctivae and EOM are normal. Right eye exhibits no discharge. Left eye exhibits no discharge. No scleral icterus.   Neck: Normal range of motion. Neck supple. No JVD present. No tracheal deviation present. No thyromegaly present.   Pulmonary/Chest: Effort normal. No stridor.   Musculoskeletal: Normal range of motion. She exhibits no edema or deformity.   Lymphadenopathy:     She has no cervical adenopathy.   Neurological: She is alert and oriented to person, place, and time. She has normal strength. No cranial nerve deficit. Coordination normal.   Skin: Skin is warm and dry. No rash noted. She is not diaphoretic. No erythema. No pallor.   Psychiatric: She has a normal mood and affect. Her speech is normal and behavior is normal. Judgment and thought content normal. Cognition and memory are normal.   Nursing note and vitals reviewed.      Assessment   Assessment:  1. Actinic keratosis    2. BMI 38.0-38.9,adult        Plan   Plan:  The lesion has completely resolved.  Call for follow-up if it returns or any new lesions present.    My  findings and recommendations were discussed and questions were answered.     Nomi Cardenas MD  09/17/18  3:02 PM

## 2018-09-17 NOTE — PATIENT INSTRUCTIONS
IF YOU SMOKE OR USE TOBACCO PLEASE READ THE FOLLOWING:    Why is smoking bad for me?  Smoking increases the risk of heart disease, lung disease, vascular disease, stroke, and cancer.     If you smoke, STOP!    If you would like more information on quitting smoking, please visit the [employer] website: [email or web address]   This link will provide additional resources including the QUIT line and the Beat the Pack support groups.     For more information:    Quit Now [place]  1-800-QUIT-NOW  https://[facility].quitlogix.org/en-US/    MyPlate from [employer]  The general, healthful diet is based on the 2010 Dietary Guidelines for Americans. The amount of food you need to eat from each food group depends on your age, sex, and level of physical activity and can be individualized by a dietitian. Go to [employer]ate.gov for more information.  What do I need to know about the MyPlate plan?  · Enjoy your food, but eat less.  · Avoid oversized portions.  ? ½ of your plate should include fruits and vegetables.  ? ¼ of your plate should be grains.  ? ¼ of your plate should be protein.  Grains  · Make at least half of your grains whole grains.  · For a 2,000 calorie daily food plan, eat 6 oz every day.  · 1 oz is about 1 slice bread, 1 cup cereal, or ½ cup cooked rice, cereal, or pasta.  Vegetables  · Make half your plate fruits and vegetables.  · For a 2,000 calorie daily food plan, eat 2½ cups every day.  · 1 cup is about 1 cup raw or cooked vegetables or vegetable juice or 2 cups raw leafy greens.  Fruits  · Make half your plate fruits and vegetables.  · For a 2,000 calorie daily food plan, eat 2 cups every day.  · 1 cup is about 1 cup fruit or 100% fruit juice or ½ cup dried fruit.  Protein  · For a 2,000 calorie daily food plan, eat 5½ oz every day.  · 1 oz is about 1 oz meat, poultry, or fish, ¼ cup cooked beans, 1 egg, 1 Tbsp peanut butter, or ½ oz nuts or  seeds.  Dairy  · Switch to fat-free or low-fat (1%) milk.  · For a 2,000 calorie daily food plan, eat 3 cups every day.  · 1 cup is about 1 cup milk or yogurt or soy milk (soy beverage), 1½ oz natural cheese, or 2 oz processed cheese.  Fats, Oils, and Empty Calories  · Only small amounts of oils are recommended.  · Empty calories are calories from solid fats or added sugars.  · Compare sodium in foods like soup, bread, and frozen meals. Choose the foods with lower numbers.  · Drink water instead of sugary drinks.  What foods can I eat?  Grains  Whole grains such as whole wheat, quinoa, millet, and bulgur. Bread, rolls, and pasta made from whole grains. Brown or wild rice. Hot or cold cereals made from whole grains and without added sugar.  Vegetables  All fresh vegetables, especially fresh red, dark green, or orange vegetables. Peas and beans. Low-sodium frozen or canned vegetables prepared without added salt. Low-sodium vegetable juices.  Fruits  All fresh, frozen, and dried fruits. Canned fruit packed in water or fruit juice without added sugar. Fruit juices without added sugar.  Meats and Other Protein Sources  Boiled, baked, or grilled lean meat trimmed of fat. Skinless poultry. Fresh seafood and shellfish. Canned seafood packed in water. Unsalted nuts and unsalted nut butters. Tofu. Dried beans and pea. Eggs.  Dairy  Low-fat or fat-free milk, yogurt, and cheeses.  Sweets and Desserts  Frozen desserts made from low-fat milk.  Fats and Oils  Olive, peanut, and canola oils and margarine. Salad dressing and mayonnaise made from these oils.  Other  Soups and casseroles made from allowed ingredients and without added fat or salt.  The items listed above may not be a complete list of recommended foods or beverages. Contact your dietitian for more options.  What foods are not recommended?  Grains  Sweetened, low-fiber cereals. Packaged baked goods. Snack crackers and chips. Cheese crackers, butter crackers, and  biscuits. Frozen waffles, sweet breads, doughnuts, pastries, packaged baking mixes, pancakes, cakes, and cookies.  Vegetables  Regular canned or frozen vegetables or vegetables prepared with salt. Canned tomatoes. Canned tomato sauce. Fried vegetables. Vegetables in cream sauce or cheese sauce.  Fruits  Fruits packed in syrup or made with added sugar.  Meats and Other Protein Sources  Marbled or fatty meats such as ribs. Poultry with skin. Fried meats, poultry, eggs, or fish. Sausages, hot dogs, and deli meats such as pastrami, bologna, or salami.  Dairy  Whole milk, cream, cheeses made from whole milk, sour cream. Ice cream or yogurt made from whole milk or with added sugar.  Beverages  For adults, no more than one alcoholic drink per day. Regular soft drinks or other sugary beverages. Juice drinks.  Sweets and Desserts  Sugary or fatty desserts, candy, and other sweets.  Fats and Oils  Solid shortening or partially hydrogenated oils. Solid margarine. Margarine that contains trans fats. Butter.  The items listed above may not be a complete list of foods and beverages to avoid. Contact your dietitian for more information.  This information is not intended to replace advice given to you by your health care provider. Make sure you discuss any questions you have with your health care provider.  Document Released: 01/06/2009 Document Revised: 05/25/2017 Document Reviewed: 11/26/2014  Elsevier Interactive Patient Education © 2018 [employer]     Calorie Counting for Weight Loss  Calories are units of energy. Your body needs a certain amount of calories from food to keep you going throughout the day. When you eat more calories than your body needs, your body stores the extra calories as fat. When you eat fewer calories than your body needs, your body burns fat to get the energy it needs.  Calorie counting means keeping track of how many calories you eat and drink each day. Calorie counting can be helpful if you need to  lose weight. If you make sure to eat fewer calories than your body needs, you should lose weight. Ask your health care provider what a healthy weight is for you.  For calorie counting to work, you will need to eat the right number of calories in a day in order to lose a healthy amount of weight per week. A dietitian can help you determine how many calories you need in a day and will give you suggestions on how to reach your calorie goal.  · A healthy amount of weight to lose per week is usually 1-2 lb (0.5-0.9 kg). This usually means that your daily calorie intake should be reduced by 500-750 calories.  · Eating 1,200 - 1,500 calories per day can help most women lose weight.  · Eating 1,500 - 1,800 calories per day can help most men lose weight.    What do I need to know about calorie counting?  In order to meet your daily calorie goal, you will need to:  · Find out how many calories are in each food you would like to eat. Try to do this before you eat.  · Decide how much of the food you plan to eat.  · Write down what you ate and how many calories it had. Doing this is called keeping a food log.    To successfully lose weight, it is important to balance calorie counting with a healthy lifestyle that includes regular activity. Aim for 150 minutes of moderate exercise (such as walking) or 75 minutes of vigorous exercise (such as running) each week.  Where do I find calorie information?    The number of calories in a food can be found on a Nutrition Facts label. If a food does not have a Nutrition Facts label, try to look up the calories online or ask your dietitian for help.  Remember that calories are listed per serving. If you choose to have more than one serving of a food, you will have to multiply the calories per serving by the amount of servings you plan to eat. For example, the label on a package of bread might say that a serving size is 1 slice and that there are 90 calories in a serving. If you eat 1 slice,  "you will have eaten 90 calories. If you eat 2 slices, you will have eaten 180 calories.  How do I keep a food log?  Immediately after each meal, record the following information in your food log:  · What you ate. Don't forget to include toppings, sauces, and other extras on the food.  · How much you ate. This can be measured in cups, ounces, or number of items.  · How many calories each food and drink had.  · The total number of calories in the meal.    Keep your food log near you, such as in a small notebook in your pocket, or use a mobile gila or website. Some programs will calculate calories for you and show you how many calories you have left for the day to meet your goal.  What are some calorie counting tips?  · Use your calories on foods and drinks that will fill you up and not leave you hungry:  ? Some examples of foods that fill you up are nuts and nut butters, vegetables, lean proteins, and high-fiber foods like whole grains. High-fiber foods are foods with more than 5 g fiber per serving.  ? Drinks such as sodas, specialty coffee drinks, alcohol, and juices have a lot of calories, yet do not fill you up.  · Eat nutritious foods and avoid empty calories. Empty calories are calories you get from foods or beverages that do not have many vitamins or protein, such as candy, sweets, and soda. It is better to have a nutritious high-calorie food (such as an avocado) than a food with few nutrients (such as a bag of chips).  · Know how many calories are in the foods you eat most often. This will help you calculate calorie counts faster.  · Pay attention to calories in drinks. Low-calorie drinks include water and unsweetened drinks.  · Pay attention to nutrition labels for \"low fat\" or \"fat free\" foods. These foods sometimes have the same amount of calories or more calories than the full fat versions. They also often have added sugar, starch, or salt, to make up for flavor that was removed with the fat.  · Find a way " of tracking calories that works for you. Get creative. Try different apps or programs if writing down calories does not work for you.  What are some portion control tips?  · Know how many calories are in a serving. This will help you know how many servings of a certain food you can have.  · Use a measuring cup to measure serving sizes. You could also try weighing out portions on a kitchen scale. With time, you will be able to estimate serving sizes for some foods.  · Take some time to put servings of different foods on your favorite plates, bowls, and cups so you know what a serving looks like.  · Try not to eat straight from a bag or box. Doing this can lead to overeating. Put the amount you would like to eat in a cup or on a plate to make sure you are eating the right portion.  · Use smaller plates, glasses, and bowls to prevent overeating.  · Try not to multitask (for example, watch TV or use your computer) while eating. If it is time to eat, sit down at a table and enjoy your food. This will help you to know when you are full. It will also help you to be aware of what you are eating and how much you are eating.  What are tips for following this plan?  Reading food labels  · Check the calorie count compared to the serving size. The serving size may be smaller than what you are used to eating.  · Check the source of the calories. Make sure the food you are eating is high in vitamins and protein and low in saturated and trans fats.  Shopping  · Read nutrition labels while you shop. This will help you make healthy decisions before you decide to purchase your food.  · Make a grocery list and stick to it.  Cooking  · Try to cook your favorite foods in a healthier way. For example, try baking instead of frying.  · Use low-fat dairy products.  Meal planning  · Use more fruits and vegetables. Half of your plate should be fruits and vegetables.  · Include lean proteins like poultry and fish.  How do I count calories when  eating out?  · Ask for smaller portion sizes.  · Consider sharing an entree and sides instead of getting your own entree.  · If you get your own entree, eat only half. Ask for a box at the beginning of your meal and put the rest of your entree in it so you are not tempted to eat it.  · If calories are listed on the menu, choose the lower calorie options.  · Choose dishes that include vegetables, fruits, whole grains, low-fat dairy products, and lean protein.  · Choose items that are boiled, broiled, grilled, or steamed. Stay away from items that are buttered, battered, fried, or served with cream sauce. Items labeled “crispy” are usually fried, unless stated otherwise.  · Choose water, low-fat milk, unsweetened iced tea, or other drinks without added sugar. If you want an alcoholic beverage, choose a lower calorie option such as a glass of wine or light beer.  · Ask for dressings, sauces, and syrups on the side. These are usually high in calories, so you should limit the amount you eat.  · If you want a salad, choose a garden salad and ask for grilled meats. Avoid extra toppings like jacques, cheese, or fried items. Ask for the dressing on the side, or ask for olive oil and vinegar or lemon to use as dressing.  · Estimate how many servings of a food you are given. For example, a serving of cooked rice is ½ cup or about the size of half a baseball. Knowing serving sizes will help you be aware of how much food you are eating at restaurants. The list below tells you how big or small some common portion sizes are based on everyday objects:  ? 1 oz--4 stacked dice.  ? 3 oz--1 deck of cards.  ? 1 tsp--1 die.  ? 1 Tbsp--½ a ping-pong ball.  ? 2 Tbsp--1 ping-pong ball.  ? ½ cup--½ baseball.  ? 1 cup--1 baseball.  Summary  · Calorie counting means keeping track of how many calories you eat and drink each day. If you eat fewer calories than your body needs, you should lose weight.  · A healthy amount of weight to lose per week  is usually 1-2 lb (0.5-0.9 kg). This usually means reducing your daily calorie intake by 500-750 calories.  · The number of calories in a food can be found on a Nutrition Facts label. If a food does not have a Nutrition Facts label, try to look up the calories online or ask your dietitian for help.  · Use your calories on foods and drinks that will fill you up, and not on foods and drinks that will leave you hungry.  · Use smaller plates, glasses, and bowls to prevent overeating.  This information is not intended to replace advice given to you by your health care provider. Make sure you discuss any questions you have with your health care provider.  Document Released: 12/18/2006 Document Revised: 11/17/2017 Document Reviewed: 11/17/2017  [employer] Interactive Patient Education © 2018 [employer]     Exercising to Lose Weight  Exercising can help you to lose weight. In order to lose weight through exercise, you need to do vigorous-intensity exercise. You can tell that you are exercising with vigorous intensity if you are breathing very hard and fast and cannot hold a conversation while exercising.  Moderate-intensity exercise helps to maintain your current weight. You can tell that you are exercising at a moderate level if you have a higher heart rate and faster breathing, but you are still able to hold a conversation.  How often should I exercise?  Choose an activity that you enjoy and set realistic goals. Your health care provider can help you to make an activity plan that works for you. Exercise regularly as directed by your health care provider. This may include:  · Doing resistance training twice each week, such as:  ? Push-ups.  ? Sit-ups.  ? Lifting weights.  ? Using resistance bands.  · Doing a given intensity of exercise for a given amount of time. Choose from these options:  ? 150 minutes of moderate-intensity exercise every week.  ? 75 minutes of vigorous-intensity exercise every week.  ? A mix of  moderate-intensity and vigorous-intensity exercise every week.    Children, pregnant women, people who are out of shape, people who are overweight, and older adults may need to consult a health care provider for individual recommendations. If you have any sort of medical condition, be sure to consult your health care provider before starting a new exercise program.  What are some activities that can help me to lose weight?  · Walking at a rate of at least 4.5 miles an hour.  · Jogging or running at a rate of 5 miles per hour.  · Biking at a rate of at least 10 miles per hour.  · Lap swimming.  · Roller-skating or in-line skating.  · Cross-country skiing.  · Vigorous competitive sports, such as football, basketball, and soccer.  · Jumping rope.  · Aerobic dancing.  How can I be more active in my day-to-day activities?  · Use the stairs instead of the elevator.  · Take a walk during your lunch break.  · If you drive, park your car farther away from work or school.  · If you take public transportation, get off one stop early and walk the rest of the way.  · Make all of your phone calls while standing up and walking around.  · Get up, stretch, and walk around every 30 minutes throughout the day.  What guidelines should I follow while exercising?  · Do not exercise so much that you hurt yourself, feel dizzy, or get very short of breath.  · Consult your health care provider prior to starting a new exercise program.  · Wear comfortable clothes and shoes with good support.  · Drink plenty of water while you exercise to prevent dehydration or heat stroke. Body water is lost during exercise and must be replaced.  · Work out until you breathe faster and your heart beats faster.  This information is not intended to replace advice given to you by your health care provider. Make sure you discuss any questions you have with your health care provider.  Document Released: 01/20/2012 Document Revised: 05/25/2017 Document Reviewed:  05/21/2015  Elsevier Interactive Patient Education © 2018 [employer] Inc[employer]

## 2019-06-15 NOTE — ANESTHESIA PROCEDURE NOTES
Airway  Urgency: elective    Airway not difficult    General Information and Staff    Patient location during procedure: OR  CRNA: GEORGE KING    Indications and Patient Condition  Indications for airway management: airway protection    Preoxygenated: yes  MILS maintained throughout  Mask difficulty assessment: 1 - vent by mask    Final Airway Details  Final airway type: endotracheal airway      Successful airway: ETT  Cuffed: yes   Successful intubation technique: direct laryngoscopy  Facilitating devices/methods: intubating stylet  Endotracheal tube insertion site: oral  Blade: J Carlos  Blade size: 3.5.  ETT size: 7.5 mm  Cormack-Lehane Classification: grade I - full view of glottis  Placement verified by: chest auscultation and capnometry   Measured from: gums  ETT to gums (cm): 22  Number of attempts at approach: 1    Additional Comments  ATRAUMATIC INTUBATION.  DL X1 PER SRNA.  TEETH PER PREOP ASSESSMENT.             [address] Comprehensive

## 2020-07-30 ENCOUNTER — OFFICE VISIT (OUTPATIENT)
Dept: NEUROLOGY | Age: 50
End: 2020-07-30
Payer: COMMERCIAL

## 2020-07-30 ENCOUNTER — TELEPHONE (OUTPATIENT)
Dept: NEUROLOGY | Age: 50
End: 2020-07-30

## 2020-07-30 VITALS
HEART RATE: 84 BPM | DIASTOLIC BLOOD PRESSURE: 77 MMHG | SYSTOLIC BLOOD PRESSURE: 106 MMHG | BODY MASS INDEX: 37.47 KG/M2 | HEIGHT: 69 IN | WEIGHT: 253 LBS

## 2020-07-30 DIAGNOSIS — M79.605 PAIN IN BOTH LOWER EXTREMITIES: ICD-10-CM

## 2020-07-30 DIAGNOSIS — M79.604 PAIN IN BOTH LOWER EXTREMITIES: ICD-10-CM

## 2020-07-30 PROBLEM — R41.3 MEMORY LOSS: Status: ACTIVE | Noted: 2020-07-30

## 2020-07-30 PROBLEM — R20.0 NUMBNESS: Status: ACTIVE | Noted: 2020-07-30

## 2020-07-30 PROBLEM — R94.02 ABNORMAL BRAIN SCAN: Status: ACTIVE | Noted: 2020-07-30

## 2020-07-30 LAB
CORTISOL - AM: 2.2 UG/DL (ref 6.2–19.4)
HIV-1 P24 AG: NORMAL
RAPID HIV 1&2: NORMAL
RHEUMATOID FACTOR: <10 IU/ML
SEDIMENTATION RATE, ERYTHROCYTE: 9 MM/HR (ref 0–25)

## 2020-07-30 PROCEDURE — 99204 OFFICE O/P NEW MOD 45 MIN: CPT | Performed by: PSYCHIATRY & NEUROLOGY

## 2020-07-30 RX ORDER — CLOPIDOGREL BISULFATE 75 MG/1
TABLET ORAL
COMMUNITY
Start: 2020-07-28

## 2020-07-30 RX ORDER — FAMOTIDINE 40 MG/1
TABLET, FILM COATED ORAL
COMMUNITY
Start: 2020-07-08

## 2020-07-30 NOTE — PROGRESS NOTES
Chief Complaint   Patient presents with    New Patient     Referred by Jennifer Huerta for TIA        Alexa Foy is a 44y.o. year old female who is seen for evaluation of random shooting pain in her extremities. She indicates this will occur randomly and feel like an electric shock that lasts less than a second. She denies any clear triggers or exacerbating or relieving factors. She denies any recent illnesses or changes in medication. 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 She has some occasional numbness in her hands and may drop things. She has generalized pain but attributes this to her work. She denied diplopia, dysarthria or dysphagia. She did have an episode in 7/19 where she could not remember the number 5 for a few minutes. Her MRI of the brain at that time was suggestive of a possible chronic infarct in the right frontal lobe. She denies any previous history of stroke. She does have irritable bowel symptoms.  .    Active Ambulatory Problems     Diagnosis Date Noted    Dysmenorrhea     Hydrosalpinx     Pelvic adhesive disease     Pelvic pain in female     Abdominal bloating 05/27/2016    Flatulence/gas pain/belching 05/27/2016    Alternating constipation and diarrhea 05/27/2016    Loose stools 08/02/2016    Dyspepsia 09/30/2016    Pain in both lower extremities 07/30/2020    Abnormal brain scan 07/30/2020    Memory loss 07/30/2020    Numbness 07/30/2020     Resolved Ambulatory Problems     Diagnosis Date Noted    No Resolved Ambulatory Problems     Past Medical History:   Diagnosis Date    Arthritis     Diarrhea     Hyperlipidemia        Past Surgical History:   Procedure Laterality Date    [facility]  2015    [facility]    COLONOSCOPY N/A 12/12/2016    Dr Juan Francisco Fuentes-[facility]--10 yr recall    HYSTERECTOMY, VAGINAL      [facility]    AL EGD TRANSORAL BIOPSY SINGLE/MULTIPLE N/A 12/12/2016    Dr Juan Francisco Fuentes-[facility]    SALPINGECTOMY Bilateral 5/11/2016 use of accessory muscles  Musculoskeletal - no significant wasting of muscles noted, no bony deformities  Extremities-no clubbing, cyanosis or edema  Skin - warm, dry, and intact. No rash, erythema, or pallor. Psychiatric - mood, affect, and behavior appear normal.      Neurological exam  Awake, alert, fluent oriented x 3 appropriate affect  Attention and concentration appear appropriate  Recent and remote memory appears unremarkable  Speech normal without dysarthria  No clear issues with language of fund of knowledge    Cranial Nerve Exam   CN II- Visual fields grossly unremarkable  CN III, IV,VI-EOMI, No nystagmus, conjugate eye movements, no ptosis  CN V-sensation intact to LT over face  CN VII-no facial assymetry  CN VIII-Hearing intact to finger rub  CN IX and X- Palate not tested  CN XI-not test shoulder shrug  CN XII-Tongue midline with no fasciculations or fibrillations    Motor Exam  Mild generalized giveway weakness throughout upper and lower extremities bilaterally, no cogwheeling, normal tone    Sensory Exam  Sensation intact to light touch and temperature upper and lower extremities bilaterally    Reflexes   2+ biceps bilaterally  2+ brachioradialis  2+patella  2+ ankle jerks  No clonus ankles  No Almanzar's sign bilateral hands    Tremors- no tremors in hands or head noted    Gait  Normal base and speed  No ataxia    Coordination  Finger to nose-unremarkable    No results found for: [id number]  Lab Results   Component Value Date    WBC 8.6 05/05/2016    HGB 14.1 05/05/2016    HCT 39.4 05/05/2016    MCV 87.0 05/05/2016     05/05/2016     No results found for: NA, K, CL, CO2, BUN, CREATININE, GLUCOSE, CALCIUM, PROT, LABALBU, BILITOT, ALKPHOS, AST, ALT, LABGLOM, GFRAA, AGRATIO, GLOB        Assessment    ICD-10-CM    1.  Pain in both lower extremities  E02.200 Anti-Neutrophilic Cytoplasmic Antibody    M79.605 Anti-DNA Antibody, Double-Stranded     Electrophoresis Protein, Serum with Reflex to Immunofixation     Celiac Reflex Panel     Celiac AG IGA/IGG Screen w Reflex     B. Burgdorferi Antibodies by WB     Nerve Conduction Test with EMG     Gliadin Antibody, IgA     HIV 1 Antibody     [place]/Lambda Free Lt Chains, Serum Quant     Lupus (LE) panel w/ reflex     LYME (B.BURGDORFERI) PCR     Lyme Disease Acute Reflexive Panel     Methylmalonic Acid, Serum     Rheumatoid Factor     West Nile Antibodies, IgG and IgM     RPR Reflex to Titer and TPPA     Cortisol AM, Total     Copper, Serum     Zinc     Sedimentation Rate     Heavy Metal Blood Panel     RON 2 - Anti SSA & Anti SSB   2. Abnormal brain scan  R94.02    3. Memory loss  R41.3    4. Numbness  R20.0    5. Alternating constipation and diarrhea  R19.8        Her neurological examination today was significant for some mild generalized giveway weakness. Based upon her history and examination it is unclear what the etiology of her random pains are. It is possible she has fibromyalgia with her widespread pain. A small fiber neuropathy is in the differential. At this time she will be scheduled for extensive blood work as noted along with an EMG with nerve conduction study of her arms. She did not wish to initiate any medications for her symptoms. She indicates she is unable to take aspirin due to some issues with her intestines although she cannot recall what it is. The patient indicated understanding of the management plan. She is to follow up with me in one month and call with any issues. Plan    Orders Placed This Encounter   Procedures    Anti-Neutrophilic Cytoplasmic Antibody    Anti-DNA Antibody, Double-Stranded    Electrophoresis Protein, Serum with Reflex to Immunofixation    Celiac Reflex Panel    Celiac AG IGA/IGG Screen w Reflex    B.  Burgdorferi Antibodies by WB    Gliadin Antibody, IgA    HIV 1 Antibody    [place]/Lambda Free Lt Chains, Serum Quant    Lupus (LE) panel w/ reflex    LYME (B.BURGDORFERI) PCR    Lyme Disease Acute Reflexive Panel    Methylmalonic Acid, Serum    Rheumatoid Factor    West Nile Antibodies, IgG and IgM    RPR Reflex to Titer and TPPA    Cortisol AM, Total    Copper, Serum    Zinc    Sedimentation Rate    Heavy Metal Blood Panel    RON 2 - Anti SSA & Anti SSB    Nerve Conduction Test with EMG       No orders of the defined types were placed in this encounter. Return in about 4 weeks (around 8/27/2020). EMR Dragon/transcription disclaimer:Significant part of this  encounter note is electronic transcription/translation of spoken language to printed text. The electronic translation of spoken language may be erroneous, or at times, nonsensical words or phrases may be inadvertently transcribed.  Although I have reviewed the note for such errors, some may still exist.

## 2020-08-01 LAB
ANCA IFA: NORMAL
GLIADIN ANTIBODIES IGA: 4 UNITS (ref 0–19)

## 2020-08-02 LAB
ANA IGG, ELISA: NORMAL
C3 COMPLEMENT: 125 MG/DL (ref 88–201)
C4 COMPLEMENT: 17 MG/DL (ref 10–40)
ENA TO SSB (LA) ANTIBODY: 0 AU/ML (ref 0–40)
LYME, EIA: 0.51 LIV (ref 0–1.2)
RHEUMATOID FACTOR: <10 IU/ML (ref 0–14)
SSA 52 (RO) (ENA) AB, IGG: 2 AU/ML (ref 0–40)

## 2020-08-03 LAB
ALBUMIN SERPL-MCNC: 4.33 G/DL (ref 3.75–5.01)
ALPHA-1-GLOBULIN: 0.23 G/DL (ref 0.19–0.46)
ALPHA-2-GLOBULIN: 0.68 G/DL (ref 0.48–1.05)
BETA GLOBULIN: 0.72 G/DL (ref 0.48–1.1)
CELIAC PANEL: 6 UNITS (ref 0–19)
GAMMA GLOBULIN: 0.74 G/DL (ref 0.62–1.51)
IMMUNOFIXATION REFLEX: NORMAL
METHYLMALONIC ACID: 0.22 UMOL/L (ref 0–0.4)
RPR: NORMAL
SPE/IFE INTERPRETATION: NORMAL
TOTAL PROTEIN: 6.7 G/DL (ref 6.3–8.2)
WEST NILE VIRUS, IGG: 0.28 IV
WEST NILE VIRUS, IGM: 0.08 IV

## 2020-08-04 LAB
ARSENIC BLOOD: <10 UG/L
CADMIUM: <1 UG/L
FREE KAPPA LIGHT CHAINS: 1.33 MG/DL (ref 0.37–1.94)
FREE KAPPA/LAMBDA RATIO: 0.89 (ref 0.26–1.65)
FREE LAMBDA LIGHT CHAINS: 1.49 MG/DL (ref 0.57–2.63)
LEAD LEVEL BLOOD: <2 UG/DL
MERCURY BLOOD: <2.5 UG/L

## 2020-08-05 LAB
ANTI DNA DOUBLE STRANDED: 10 IU/ML
COPPER: 125 UG/DL (ref 80–155)
GLIADIN ANTIBODIES IGA: 1.2 U/ML
GLIADIN ANTIBODIES IGG: <0.4 U/ML
IGA: 120 MG/DL (ref 70–400)
TISSUE TRANSGLUTAMINASE IGA: 0.3 U/ML
ZINC: 69.7 UG/DL (ref 60–120)

## 2020-08-17 ENCOUNTER — HOSPITAL ENCOUNTER (OUTPATIENT)
Dept: NEUROLOGY | Age: 50
Discharge: HOME | End: 2020-08-17
Payer: COMMERCIAL

## 2020-08-17 PROCEDURE — 95886 MUSC TEST DONE W/N TEST COMP: CPT | Performed by: PSYCHIATRY & NEUROLOGY

## 2020-08-17 PROCEDURE — 95886 MUSC TEST DONE W/N TEST COMP: CPT

## 2020-08-17 PROCEDURE — 95911 NRV CNDJ TEST 9-10 STUDIES: CPT

## 2020-08-17 PROCEDURE — 95911 NRV CNDJ TEST 9-10 STUDIES: CPT | Performed by: PSYCHIATRY & NEUROLOGY

## 2020-08-20 LAB
LYME (B. BURGDORFERI) AB IGG WB: NEGATIVE
LYME AB IGM BY WB:: NEGATIVE

## 2020-08-23 LAB
ARSENIC BLOOD: <10 UG/L
CADMIUM: <1 UG/L
LEAD LEVEL BLOOD: <2 UG/DL
MERCURY BLOOD: <2.5 UG/L

## 2020-08-28 ENCOUNTER — OFFICE VISIT (OUTPATIENT)
Dept: NEUROLOGY | Age: 50
End: 2020-08-28
Payer: COMMERCIAL

## 2020-08-28 VITALS
WEIGHT: 253 LBS | HEART RATE: 75 BPM | HEIGHT: 69 IN | SYSTOLIC BLOOD PRESSURE: 112 MMHG | BODY MASS INDEX: 37.47 KG/M2 | DIASTOLIC BLOOD PRESSURE: 76 MMHG

## 2020-08-28 PROCEDURE — 99214 OFFICE O/P EST MOD 30 MIN: CPT | Performed by: PSYCHIATRY & NEUROLOGY

## 2020-08-28 NOTE — PROGRESS NOTES
Chief Complaint   Patient presents with    Follow-up     1 month follow up        Waldo Adorno is a 44y.o. year old female who is seen for evaluation of random shooting pain in her extremities. She indicates this will occur randomly and feel like an electric shock that lasts less than a second. She denies any clear triggers or exacerbating or relieving factors. She denies any recent illnesses or changes in medication. 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 She has some occasional numbness in her hands and may drop things. She has generalized pain but attributes this to her work. She denied diplopia, dysarthria or dysphagia. She did have an episode in 7/19 where she could not remember the number 5 for a few minutes. Her MRI of the brain at that time was suggestive of a possible chronic infarct in the right frontal lobe. She denies any previous history of stroke. She does have irritable bowel symptoms. No new issues.     Active Ambulatory Problems     Diagnosis Date Noted    Dysmenorrhea     Hydrosalpinx     Pelvic adhesive disease     Pelvic pain in female     Abdominal bloating 05/27/2016    Flatulence/gas pain/belching 05/27/2016    Alternating constipation and diarrhea 05/27/2016    Loose stools 08/02/2016    Dyspepsia 09/30/2016    Pain in both lower extremities 07/30/2020    Abnormal brain scan 07/30/2020    Memory loss 07/30/2020    Numbness 07/30/2020     Resolved Ambulatory Problems     Diagnosis Date Noted    No Resolved Ambulatory Problems     Past Medical History:   Diagnosis Date    Arthritis     Diarrhea     Hyperlipidemia        Past Surgical History:   Procedure Laterality Date    [facility]OSCOPY  2015    [facility]    COLONOSCOPY N/A 12/12/2016    Dr Galileo [facility]entes-[facility]--10 yr recall    HYSTERECTOMY, VAGINAL      [facility]    [place] EGD TRANSORAL BIOPSY SINGLE/MULTIPLE N/A 12/12/2016    Dr Galileo Fuentes-G[facility]    SALPINGECTOMY Bilateral 5/11/2016    EXAM UNDER ANESTHESIA; DIAGNOSTIC LAPAROSCOPY, RIGHT SALPINGECTOMY, WITH LYSIS OF ADHESIONS, PAPSMEAR  performed by Chrystal Hammond MD at [address]      UPPER GASTROINTESTINAL ENDOSCOPY  12/12/2016       Family History   Problem Relation Age of Onset    Other Mother         intestinal problems    Colon Polyps Mother     Cancer Father         esophogus    Diabetes Father     Heart Failure Father     Esophageal Cancer Father     Stomach Cancer Maternal Grandmother     Heart Failure Brother     Colon Polyps Maternal Grandfather     Colon Cancer Neg Hx     Liver Cancer Neg Hx     Liver Disease Neg Hx     Rectal Cancer Neg Hx        Allergies   Allergen Reactions    Latex        Social History     Socioeconomic History    Marital status:      Spouse name: Not on file    Number of children: Not on file    Years of education: Not on file    Highest education level: Not on file   Occupational History    Not on file   Social Needs    Financial resource strain: Not on file    Food insecurity     Worry: Not on file     Inability: Not on file   [employer] needs     Medical: Not on file     Non-medical: Not on file   Tobacco Use    Smoking status: Current Every Day Smoker     Packs/day: 1.00     Years: 37.00     Pack years: 37.00     Types: Cigarettes    Smokeless tobacco: Never Used   Substance and Sexual Activity    Alcohol use:  Yes     Alcohol/week: 0.0 standard drinks     Comment: rarely    Drug use: No    Sexual activity: Yes   Lifestyle    Physical activity     Days per week: Not on file     Minutes per session: Not on file    Stress: Not on file   Relationships    Social connections     Talks on phone: Not on file     Gets together: Not on file     Attends Sabianist service: Not on file     Active member of club or organization: Not on file     Attends meetings of clubs or organizations: Not on file     Relationship status: Not on file   [facility] significant wasting of muscles noted, no bony deformities  Extremities-no clubbing, cyanosis or edema  Skin - warm, dry, and intact. No rash, erythema, or pallor. Psychiatric - mood, affect, and behavior appear normal.      Neurological exam  Awake, alert, fluent oriented  appropriate affect  Attention and concentration appear appropriate  Recent and remote memory appears unremarkable  Speech normal without dysarthria  No clear issues with language of fund of knowledge    Cranial Nerve Exam     CN III, IV,VI-EOMI, No nystagmus, conjugate eye movements, no ptosis  CN VII-no facial assymetry        Motor Exam  antigravity throughout upper and lower extremities bilaterally    Tremors- no tremors in hands or head noted    Gait  Normal base and speed  No ataxia    No results found for: [id number]  Lab Results   Component Value Date    WBC 8.6 05/05/2016    HGB 14.1 05/05/2016    HCT 39.4 05/05/2016    MCV 87.0 05/05/2016     05/05/2016     Lab Results   Component Value Date    PROT 6.7 07/30/2020    LABALBU 4.33 07/30/2020           Assessment    ICD-10-CM    1. Pain in both lower extremities  M79.604     M79.605    2. Abnormal brain scan  R94.02    3. Memory loss  R41.3    4. Numbness  R20.0        Her neurological examination today was significant for some mild generalized giveway weakness. Based upon her history and examination it is unclear what the etiology of her random pains are. It is possible she has fibromyalgia with her widespread pain. A small fiber neuropathy is in the differential. Her extensive blood work was unremarkable except for a low cortisol level. She is to follow up with her PCP about this. Her EMG with nerve conduction study of her arms was unremarkable. She did not wish to initiate any medications for her symptoms. She indicates she is unable to take aspirin due to some issues with her intestines although she cannot recall what it is.  The patient indicated understanding of the management

## 2022-04-21 ENCOUNTER — TELEPHONE (OUTPATIENT)
Dept: OTOLARYNGOLOGY | Facility: CLINIC | Age: 52
End: 2022-04-21

## 2022-04-21 NOTE — TELEPHONE ENCOUNTER
I have left a message for patient to call me back so we can get her in for evaluation for nose lesion. Waiting on call back.

## 2022-04-22 ENCOUNTER — HOSPITAL ENCOUNTER (OUTPATIENT)
Dept: WOMENS IMAGING | Age: 52
Discharge: HOME | End: 2022-04-22
Payer: COMMERCIAL

## 2022-04-22 DIAGNOSIS — Z12.31 ENCOUNTER FOR SCREENING MAMMOGRAM FOR MALIGNANT NEOPLASM OF BREAST: ICD-10-CM

## 2022-04-22 PROCEDURE — 77067 SCR MAMMO BI INCL CAD: CPT

## 2022-05-23 ENCOUNTER — OFFICE VISIT (OUTPATIENT)
Dept: OTOLARYNGOLOGY | Facility: CLINIC | Age: 52
End: 2022-05-23

## 2022-05-23 VITALS
TEMPERATURE: 98 F | RESPIRATION RATE: 20 BRPM | HEIGHT: 69 IN | HEART RATE: 80 BPM | SYSTOLIC BLOOD PRESSURE: 135 MMHG | BODY MASS INDEX: 35.55 KG/M2 | WEIGHT: 240 LBS | DIASTOLIC BLOOD PRESSURE: 78 MMHG

## 2022-05-23 DIAGNOSIS — D48.5 NEOPLASM OF UNCERTAIN BEHAVIOR OF SKIN: ICD-10-CM

## 2022-05-23 DIAGNOSIS — Z72.0 TOBACCO ABUSE: ICD-10-CM

## 2022-05-23 DIAGNOSIS — L57.0 ACTINIC KERATOSIS: ICD-10-CM

## 2022-05-23 DIAGNOSIS — Z79.02 ANTIPLATELET OR ANTITHROMBOTIC LONG-TERM USE: Primary | ICD-10-CM

## 2022-05-23 PROCEDURE — 11104 PUNCH BX SKIN SINGLE LESION: CPT | Performed by: OTOLARYNGOLOGY

## 2022-05-23 PROCEDURE — 99204 OFFICE O/P NEW MOD 45 MIN: CPT | Performed by: OTOLARYNGOLOGY

## 2022-05-23 PROCEDURE — 88305 TISSUE EXAM BY PATHOLOGIST: CPT | Performed by: OTOLARYNGOLOGY

## 2022-05-23 RX ORDER — CLOPIDOGREL BISULFATE 75 MG/1
1 TABLET ORAL DAILY
COMMUNITY

## 2022-05-23 RX ORDER — DULOXETIN HYDROCHLORIDE 30 MG/1
1 CAPSULE, DELAYED RELEASE ORAL DAILY
COMMUNITY

## 2022-05-23 RX ORDER — DICYCLOMINE HYDROCHLORIDE 10 MG/5ML
1 SOLUTION ORAL 3 TIMES DAILY PRN
COMMUNITY

## 2022-05-23 RX ORDER — QUETIAPINE FUMARATE 25 MG/1
1 TABLET, FILM COATED ORAL
COMMUNITY
Start: 2022-04-11 | End: 2022-08-03 | Stop reason: SDUPTHER

## 2022-05-23 RX ORDER — METOPROLOL TARTRATE 50 MG/1
TABLET, FILM COATED ORAL
COMMUNITY

## 2022-05-23 RX ORDER — FAMOTIDINE 10 MG/ML
1 INJECTION, SOLUTION INTRAVENOUS
COMMUNITY

## 2022-05-23 NOTE — PROGRESS NOTES
PRIMARY CARE PROVIDER: Mildred Wasserman MD  REFERRING PROVIDER: No ref. provider found    Chief Complaint   Patient presents with   • Skin Lesion     Nose lesion        Subjective   History of Present Illness:  Phoebe Sawant is a  51 y.o. female who presents for consultation regarding a skin lesion of the nose.  The lesion has the following characteristics:    Location: right nasal tip   Quality: nonhealing lesion  Severity: mild  Duration: Since August  Timing: constant  Modifying Factors: none  Associated Signs & Symptoms: no nasal obstruction.  The lesion of the tip was a spot that she expressed/popped last night.    When I saw her on June 4, 2018, she reported a flaking, dry lesion of the right nasal tip.  A biopsy had not been performed.  We treated her with 2 weeks of 5-fluorouracil.  When seen back on September 17, 2018, the lesion had resolved.    On Plavix - Dr. Wasserman - recent stroke.    Review of Systems:  Review of Systems   Constitutional: Negative for chills, fatigue, fever and unexpected weight change.   HENT: Negative for facial swelling.    Respiratory: Negative for cough, chest tightness and shortness of breath.    Cardiovascular: Negative for chest pain.   Musculoskeletal: Negative for neck pain.   Skin: Negative for color change.   Neurological: Negative for facial asymmetry.   Hematological: Negative for adenopathy. Does not bruise/bleed easily.       Past History:  Past Medical History:   Diagnosis Date   • Abnormal bleeding in menstrual cycle    • Arthritis    • IBS (irritable bowel syndrome)    • MRSA infection     12 yrs ago, on back   • Neck pain    • Pelvic pain      Past Surgical History:   Procedure Laterality Date   • LAPAROSCOPIC TUBAL LIGATION     • SALPINGECTOMY      right tube removed   • TONSILLECTOMY     • TOTAL LAPAROSCOPIC HYSTERECTOMY SALPINGO OOPHORECTOMY Bilateral 12/14/2017    Procedure: TOTAL LAPAROSCOPIC HYSTERECTOMY BILATERAL SALPINGOOPHERECTOMY WITH DAVINCI SI ROBOT,  CYSTO;  Surgeon: Korina Waters MD;  Location: [facility] OR;  Service:      History reviewed. No pertinent family history.  Social History     Tobacco Use   • Smoking status: Current Every Day Smoker     Packs/day: 1.00     Years: 37.00     Pack years: 37.00     Types: Cigarettes   • Smokeless tobacco: Never Used   Substance Use Topics   • Alcohol use: No   • Drug use: No     Allergies:  Latex and Losartan potassium    Current Outpatient Medications:   •  AMBENONIUM CHLORIDE PO, Take  by mouth., Disp: , Rfl:   •  clopidogrel (PLAVIX) 75 MG tablet, Take 1 tablet by mouth Daily., Disp: , Rfl:   •  dicyclomine (BENTYL) 10 MG/5ML syrup, Take 1 capsule by mouth 3 (Three) Times a Day As Needed., Disp: , Rfl:   •  Doxepin HCl 5 % cream, , Disp: , Rfl: 1  •  DULoxetine (CYMBALTA) 30 MG capsule, Take 1 capsule by mouth Daily., Disp: , Rfl:   •  famotidine (PEPCID) 40 MG/4ML solution injection, Take 1 tablet by mouth every night at bedtime., Disp: , Rfl:   •  lidocaine (XYLOCAINE) 5 % ointment, , Disp: , Rfl: 1  •  metoprolol tartrate (LOPRESSOR) 50 MG tablet, TAKE ONE-HALF TABLET BY MOUTH TWICE A DAY, TAKE WITH FOOD, Disp: , Rfl:   •  ondansetron ODT (ZOFRAN ODT) 8 MG disintegrating tablet, Take 1 tablet by mouth Every 8 (Eight) Hours As Needed for Nausea or Vomiting., Disp: 15 tablet, Rfl: 0  •  pravastatin (PRAVACHOL) 40 MG tablet, , Disp: , Rfl:   •  QUEtiapine (SEROquel) 25 MG tablet, 1 tablet every night at bedtime., Disp: , Rfl:     Objective     Vital Signs:  Temp:  [98 °F (36.7 °C)] 98 °F (36.7 °C)  Heart Rate:  [80] 80  Resp:  [20] 20  BP: (135)/(78) 135/78    Physical Exam:  Physical Exam  Vitals and nursing note reviewed.   Constitutional:       General: She is not in acute distress.     Appearance: She is well-developed. She is not diaphoretic.   HENT:      Head: Normocephalic and atraumatic.      Right Ear: External ear normal.      Left Ear: External ear normal.      Nose: Nose normal.     Eyes:       General: No scleral icterus.        Right eye: No discharge.         Left eye: No discharge.      Conjunctiva/sclera: Conjunctivae normal.      Pupils: Pupils are equal, round, and reactive to light.   Neck:      Thyroid: No thyromegaly.      Vascular: No JVD.      Trachea: No tracheal deviation.   Pulmonary:      Effort: Pulmonary effort is normal.      Breath sounds: No stridor.   Musculoskeletal:         General: No deformity. Normal range of motion.      Cervical back: Normal range of motion and neck supple.   Lymphadenopathy:      Cervical: No cervical adenopathy.   Skin:     General: Skin is warm and dry.      Coloration: Skin is not pale.      Findings: No erythema or rash.   Neurological:      Mental Status: She is alert and oriented to person, place, and time.      Cranial Nerves: No cranial nerve deficit.      Coordination: Coordination normal.   Psychiatric:         Speech: Speech normal.         Behavior: Behavior normal. Behavior is cooperative.         Thought Content: Thought content normal.         Judgment: Judgment normal.        Today:      2018:    Operative plan:    If cancer, bilobe flap    Assessment   1. Antiplatelet or antithrombotic long-term use    2. Neoplasm of uncertain behavior of skin    3. Actinic keratosis    4. Tobacco abuse        Plan     I have offered biopsy of the lesion today in the office.  We will call her with the results.  If an AK, F/U 3 months for recheck;' if cancerous plan for bilobe flap in the OR.    The risks, benefits, and alternatives of the procedure including but not limited to pain, scarring, bleeding, infection, persistent symptoms, and risks of the anesthesia were discussed full with the patient. Need for further therapy, depending on the pathologic outcome, was discussed. Questions were answered. No guarantees were made or implied.      Discussion of skin lesion. Discussed risks, benefits, alternatives, and possible complications of excision of the skin  lesion with reconstruction utilizing local tissue rearrangement, full-thickness skin grafting, or local interpolated flaps. Risks include, but are not limited too: bleeding, infection, hematoma, recurrence, need for additional procedures, flap failure, cosmetic deformity. Patient understands risks and would like to proceed with surgery.     QUALITY MEASURES    Tobacco Use: Screening and Cessation Intervention  Social History    Tobacco Use      Smoking status: Current Every Day Smoker        Packs/day: 1.00        Years: 37.00        Pack years: 37        Types: Cigarettes      Smokeless tobacco: Never Used          My findings and recommendations were discussed and questions were answered.     Nomi Cardenas MD  05/23/22  14:23 CDT

## 2022-05-23 NOTE — PROGRESS NOTES
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  The skin was cleansed with alcohol.  The skin was infiltrated with 1 mL of 1% lidocaine with 1-100,000 epinephrine.  After approximately 10 minutes, a 2 millimeter punch biopsy was taken by placing circular motion on the biopsy tool, the skin was elevated and clipped with iris scissors.  The specimen was sent in formalin.  The skin was closed using a simple 5-0 fast absorbing gut suture.  Antibiotic ointment was placed over the biopsy site.  The patient tolerated the procedure with minimal discomfort.    Nomi Cardenas MD  05/23/22  14:24 CDT

## 2022-05-25 ENCOUNTER — TELEPHONE (OUTPATIENT)
Dept: OTOLARYNGOLOGY | Facility: CLINIC | Age: 52
End: 2022-05-25

## 2022-05-25 LAB
CYTO UR: NORMAL
LAB AP CASE REPORT: NORMAL
LAB AP CLINICAL INFORMATION: NORMAL
Lab: NORMAL
PATH REPORT.FINAL DX SPEC: NORMAL
PATH REPORT.GROSS SPEC: NORMAL

## 2022-05-25 NOTE — TELEPHONE ENCOUNTER
Patient has been contacted with results of her biopsy,all is cleared . She will follow up as scheduled.

## 2022-08-03 ENCOUNTER — OFFICE VISIT (OUTPATIENT)
Dept: OTOLARYNGOLOGY | Facility: CLINIC | Age: 52
End: 2022-08-03

## 2022-08-03 VITALS
WEIGHT: 226 LBS | SYSTOLIC BLOOD PRESSURE: 112 MMHG | DIASTOLIC BLOOD PRESSURE: 73 MMHG | TEMPERATURE: 98.4 F | HEART RATE: 84 BPM | BODY MASS INDEX: 33.47 KG/M2 | HEIGHT: 69 IN

## 2022-08-03 DIAGNOSIS — Z08 ENCOUNTER FOR FOLLOW-UP SURVEILLANCE OF SKIN CANCER: ICD-10-CM

## 2022-08-03 DIAGNOSIS — L57.0 ACTINIC KERATOSIS: Primary | ICD-10-CM

## 2022-08-03 DIAGNOSIS — Z85.828 ENCOUNTER FOR FOLLOW-UP SURVEILLANCE OF SKIN CANCER: ICD-10-CM

## 2022-08-03 PROCEDURE — 99214 OFFICE O/P EST MOD 30 MIN: CPT | Performed by: OTOLARYNGOLOGY

## 2022-08-03 RX ORDER — QUETIAPINE FUMARATE 25 MG/1
1 TABLET, FILM COATED ORAL
COMMUNITY

## 2022-08-03 RX ORDER — FLUOROURACIL 5 MG/G
1 CREAM TOPICAL DAILY
Qty: 1 EACH | Refills: 0 | Status: SHIPPED | OUTPATIENT
Start: 2022-08-03 | End: 2022-08-17

## 2022-08-03 NOTE — PROGRESS NOTES
PRIMARY CARE PROVIDER: Mildred Wasserman MD  REFERRING PROVIDER: No ref. provider found    Chief Complaint   Patient presents with   • Follow-up     Bx nasal tip       Subjective   History of Present Illness:  Phoebe Sawant is a  51 y.o. female who returns regarding lesion of the nose.  When seen on May 22, 2022, she complained of a lesion of the nose:    Location: right nasal tip   Quality: nonhealing lesion  Severity: mild  Duration: Since August  Timing: constant  Modifying Factors: none    I saw her on June 4, 2000 and regarding a skin lesion, this was treated with topical chemotherapy cream for 2 weeks.  I saw her on September 17, 2018 and the lesion had completely resolved.  This lesion was on the cephalic and left side of her nasal sidewall.  A 2 mm punch biopsy was performed on the 22nd 2022 demonstrating an actinic keratosis of the right nasal tip..  Today, she reports that  The lesion of her right nasal tip is still crusting and flaking.    She is on Plavix.    Review of Systems:  Review of Systems   Constitutional: Negative for chills, fatigue, fever and unexpected weight change.   HENT: Negative for facial swelling.    Respiratory: Negative for cough, chest tightness and shortness of breath.    Cardiovascular: Negative for chest pain.   Musculoskeletal: Negative for neck pain.   Skin: Negative for color change.   Neurological: Negative for facial asymmetry.   Hematological: Negative for adenopathy. Does not bruise/bleed easily.       Past History:  Past Medical History:   Diagnosis Date   • Abnormal bleeding in menstrual cycle    • Arthritis    • IBS (irritable bowel syndrome)    • MRSA infection     12 yrs ago, on back   • Neck pain    • Pelvic pain      Past Surgical History:   Procedure Laterality Date   • LAPAROSCOPIC TUBAL LIGATION     • SALPINGECTOMY      right tube removed   • TONSILLECTOMY     • TOTAL LAPAROSCOPIC HYSTERECTOMY SALPINGO OOPHORECTOMY Bilateral 12/14/2017    Procedure: TOTAL  LAPAROSCOPIC HYSTERECTOMY BILATERAL SALPINGOOPHERECTOMY WITH DAVINCI SI ROBOT, CYSTO;  Surgeon: Korina Waters MD;  Location: [facility] OR;  Service:      History reviewed. No pertinent family history.  Social History     Tobacco Use   • Smoking status: Current Every Day Smoker     Packs/day: 1.00     Years: 37.00     Pack years: 37.00     Types: Cigarettes   • Smokeless tobacco: Never Used   Vaping Use   • Vaping Use: Never used   Substance Use Topics   • Alcohol use: No   • Drug use: No     Allergies:  Latex and Losartan potassium    Current Outpatient Medications:   •  AMBENONIUM CHLORIDE PO, Take  by mouth., Disp: , Rfl:   •  clopidogrel (PLAVIX) 75 MG tablet, Take 1 tablet by mouth Daily., Disp: , Rfl:   •  dicyclomine (BENTYL) 10 MG/5ML syrup, Take 1 capsule by mouth 3 (Three) Times a Day As Needed., Disp: , Rfl:   •  Doxepin HCl 5 % cream, , Disp: , Rfl: 1  •  DULoxetine (CYMBALTA) 30 MG capsule, Take 1 capsule by mouth Daily., Disp: , Rfl:   •  famotidine (PEPCID) 40 MG/4ML solution injection, Take 1 tablet by mouth every night at bedtime., Disp: , Rfl:   •  lidocaine (XYLOCAINE) 5 % ointment, , Disp: , Rfl: 1  •  metoprolol tartrate (LOPRESSOR) 50 MG tablet, TAKE ONE-HALF TABLET BY MOUTH TWICE A DAY, TAKE WITH FOOD, Disp: , Rfl:   •  ondansetron ODT (ZOFRAN ODT) 8 MG disintegrating tablet, Take 1 tablet by mouth Every 8 (Eight) Hours As Needed for Nausea or Vomiting., Disp: 15 tablet, Rfl: 0  •  pravastatin (PRAVACHOL) 40 MG tablet, , Disp: , Rfl:   •  QUEtiapine (SEROquel) 25 MG tablet, Take 1 tablet by mouth every night at bedtime., Disp: , Rfl:       Objective     Vital Signs:  Temp:  [98.4 °F (36.9 °C)] 98.4 °F (36.9 °C)  Heart Rate:  [84] 84  BP: (112)/(73) 112/73    Physical Exam:  Physical Exam  Vitals and nursing note reviewed.   Constitutional:       General: She is not in acute distress.     Appearance: She is well-developed. She is not diaphoretic.   HENT:      Head: Normocephalic and  atraumatic.      Right Ear: External ear normal.      Left Ear: External ear normal.      Nose: Nose normal.     Eyes:      General: No scleral icterus.        Right eye: No discharge.         Left eye: No discharge.      Conjunctiva/sclera: Conjunctivae normal.      Pupils: Pupils are equal, round, and reactive to light.   Neck:      Thyroid: No thyromegaly.      Vascular: No JVD.      Trachea: No tracheal deviation.   Pulmonary:      Effort: Pulmonary effort is normal.      Breath sounds: No stridor.   Musculoskeletal:         General: No deformity. Normal range of motion.      Cervical back: Normal range of motion and neck supple.   Lymphadenopathy:      Cervical: No cervical adenopathy.   Skin:     General: Skin is warm and dry.      Coloration: Skin is not pale.      Findings: No erythema or rash.   Neurological:      Mental Status: She is alert and oriented to person, place, and time.      Cranial Nerves: No cranial nerve deficit.      Coordination: Coordination normal.   Psychiatric:         Speech: Speech normal.         Behavior: Behavior normal. Behavior is cooperative.         Thought Content: Thought content normal.         Judgment: Judgment normal.         Results Review:   I have reviewed her pathology results consistent with an actinic keratosis:      Assessment   Assessment:  1. Actinic keratosis    2. Encounter for follow-up surveillance of skin cancer        Plan   Plan:  The skin around the biopsy site also appears like actinic keratosis.  I discussed observation versus cryotherapy versus rebiopsy versus topical chemo cream.  She would like to proceed with 2 weeks of topical chemo cream and mupirocin.  Follow-up approximately 4 months.    My findings and recommendations were discussed and questions were answered.     Nomi Cardenas MD  08/03/22  13:20 CDT

## 2022-12-01 ENCOUNTER — TELEPHONE (OUTPATIENT)
Dept: OTOLARYNGOLOGY | Facility: CLINIC | Age: 52
End: 2022-12-01

## 2022-12-01 NOTE — TELEPHONE ENCOUNTER
HUB CAN READ    Unable to get through to patient, put appointment info in mail. Pt has appt on 12/7 we will tell her then too.     1/17/23 1pm audio and 1:45pm Claire

## 2022-12-07 ENCOUNTER — OFFICE VISIT (OUTPATIENT)
Dept: OTOLARYNGOLOGY | Facility: CLINIC | Age: 52
End: 2022-12-07

## 2022-12-07 VITALS — SYSTOLIC BLOOD PRESSURE: 111 MMHG | TEMPERATURE: 97.2 F | DIASTOLIC BLOOD PRESSURE: 75 MMHG | HEART RATE: 75 BPM

## 2022-12-07 DIAGNOSIS — L57.0 ACTINIC KERATOSIS: ICD-10-CM

## 2022-12-07 DIAGNOSIS — Z08 ENCOUNTER FOR FOLLOW-UP SURVEILLANCE OF SKIN CANCER: ICD-10-CM

## 2022-12-07 DIAGNOSIS — D48.5 NEOPLASM OF UNCERTAIN BEHAVIOR OF SKIN: Primary | ICD-10-CM

## 2022-12-07 DIAGNOSIS — Z85.828 ENCOUNTER FOR FOLLOW-UP SURVEILLANCE OF SKIN CANCER: ICD-10-CM

## 2022-12-07 PROCEDURE — 99213 OFFICE O/P EST LOW 20 MIN: CPT | Performed by: OTOLARYNGOLOGY

## 2022-12-07 NOTE — H&P (VIEW-ONLY)
PRIMARY CARE PROVIDER: Mildred Wasserman MD  REFERRING PROVIDER: No ref. provider found    Chief Complaint   Patient presents with   • Actinic Keratosis     Follow up on AK of nose       Subjective   History of Present Illness:  Phoebe Padilla is a  52 y.o. female who returns regarding lesion of the nose.      I saw her on June 4, 2000 and regarding a skin lesion on the cephalic and left side of her nasal sidewall.  This was treated with topical chemotherapy cream for 2 weeks.  I saw her on September 17, 2018 and the lesion had completely resolved.  On August 3, she reported that the lesion of her right nasal tip was still crusting and flaking.  We discussed cryo versus topical chemo cream.  She proceeded with 2 weeks of topical chemo cream.  Today, she reports that the lesion has persisted.  She did use 2 weeks of chemo cream, and the area got very red.  The crusting and flaking persists.  She denies any nasal obstruction.    She is on Plavix.    Review of Systems:  Review of Systems   Constitutional: Negative for chills, fatigue, fever and unexpected weight change.   HENT: Negative for facial swelling.    Respiratory: Negative for cough, chest tightness and shortness of breath.    Cardiovascular: Negative for chest pain.   Musculoskeletal: Negative for neck pain.   Skin: Negative for color change.   Neurological: Negative for facial asymmetry.   Hematological: Negative for adenopathy. Does not bruise/bleed easily.       Past History:  Past Medical History:   Diagnosis Date   • Abnormal bleeding in menstrual cycle    • Arthritis    • IBS (irritable bowel syndrome)    • MRSA infection     12 yrs ago, on back   • Neck pain    • Pelvic pain      Past Surgical History:   Procedure Laterality Date   • LAPAROSCOPIC TUBAL LIGATION     • SALPINGECTOMY      right tube removed   • TONSILLECTOMY     • TOTAL LAPAROSCOPIC HYSTERECTOMY SALPINGO OOPHORECTOMY Bilateral 12/14/2017    Procedure: TOTAL LAPAROSCOPIC HYSTERECTOMY  BILATERAL SALPINGOOPHERECTOMY WITH DAVINCI SI ROBOT, CYSTO;  Surgeon: Korina Waters MD;  Location: [facility] OR;  Service:      History reviewed. No pertinent family history.  Social History     Tobacco Use   • Smoking status: Every Day     Packs/day: 1.00     Years: 37.00     Pack years: 37.00     Types: Cigarettes   • Smokeless tobacco: Never   Vaping Use   • Vaping Use: Never used   Substance Use Topics   • Alcohol use: No   • Drug use: No     Allergies:  Latex and Losartan potassium    Current Outpatient Medications:   •  AMBENONIUM CHLORIDE PO, Take  by mouth., Disp: , Rfl:   •  clopidogrel (PLAVIX) 75 MG tablet, Take 1 tablet by mouth Daily., Disp: , Rfl:   •  dicyclomine (BENTYL) 10 MG/5ML syrup, Take 1 capsule by mouth 3 (Three) Times a Day As Needed., Disp: , Rfl:   •  DULoxetine (CYMBALTA) 30 MG capsule, Take 1 capsule by mouth Daily., Disp: , Rfl:   •  famotidine (PEPCID) 40 MG/4ML solution injection, Take 1 tablet by mouth every night at bedtime., Disp: , Rfl:   •  metoprolol tartrate (LOPRESSOR) 50 MG tablet, TAKE ONE-HALF TABLET BY MOUTH TWICE A DAY, TAKE WITH FOOD, Disp: , Rfl:   •  pravastatin (PRAVACHOL) 40 MG tablet, , Disp: , Rfl:   •  QUEtiapine (SEROquel) 25 MG tablet, Take 1 tablet by mouth every night at bedtime., Disp: , Rfl:   •  Doxepin HCl 5 % cream, , Disp: , Rfl: 1  •  lidocaine (XYLOCAINE) 5 % ointment, , Disp: , Rfl: 1  •  ondansetron ODT (ZOFRAN ODT) 8 MG disintegrating tablet, Take 1 tablet by mouth Every 8 (Eight) Hours As Needed for Nausea or Vomiting., Disp: 15 tablet, Rfl: 0      Objective     Vital Signs:  Temp:  [97.2 °F (36.2 °C)] 97.2 °F (36.2 °C)  Heart Rate:  [75] 75  BP: (111)/(75) 111/75    Physical Exam:  Physical Exam  Vitals and nursing note reviewed.   Constitutional:       General: She is not in acute distress.     Appearance: She is well-developed. She is not diaphoretic.   HENT:      Head: Normocephalic and atraumatic.      Right Ear: External ear normal.       Left Ear: External ear normal.      Nose: Nose normal.     Eyes:      General: No scleral icterus.        Right eye: No discharge.         Left eye: No discharge.      Conjunctiva/sclera: Conjunctivae normal.      Pupils: Pupils are equal, round, and reactive to light.   Neck:      Thyroid: No thyromegaly.      Vascular: No JVD.      Trachea: No tracheal deviation.   Pulmonary:      Effort: Pulmonary effort is normal.      Breath sounds: No stridor.   Musculoskeletal:         General: No deformity. Normal range of motion.      Cervical back: Normal range of motion and neck supple.   Lymphadenopathy:      Cervical: No cervical adenopathy.   Skin:     General: Skin is warm and dry.      Coloration: Skin is not pale.      Findings: No erythema or rash.   Neurological:      Mental Status: She is alert and oriented to person, place, and time.      Cranial Nerves: No cranial nerve deficit.      Coordination: Coordination normal.   Psychiatric:         Speech: Speech normal.         Behavior: Behavior normal. Behavior is cooperative.         Thought Content: Thought content normal.         Judgment: Judgment normal.             Results Review:   I have reviewed her pathology results consistent with an actinic keratosis:      Assessment   Assessment:  1. Neoplasm of uncertain behavior of skin    2. Encounter for follow-up surveillance of skin cancer    3. Actinic keratosis        Plan   Plan:    This actinic keratosis is refractory to biopsy and chemotherapy.  He continues to bother her.  I discussed the options of cryotherapy, repeat chemo cream, shave biopsy, punch biopsy, versus excision.  She would like this removed, and we will set her up for the operating room.  Plan for excision with permanent section analysis and likely East-West flap.    Discussion of skin lesion. Discussed risks, benefits, alternatives, and possible complications of excision of the skin lesion with reconstruction utilizing local tissue  rearrangement, full-thickness skin grafting, or local interpolated flaps. Risks include, but are not limited too: bleeding, infection, hematoma, recurrence, need for additional procedures, flap failure, cosmetic deformity. Patient understands risks and would like to proceed with surgery.  Alternatives include doing nothing.    My findings and recommendations were discussed and questions were answered.     Nomi Cardenas MD  12/07/22  13:48 CST

## 2022-12-07 NOTE — PROGRESS NOTES
PRIMARY CARE PROVIDER: Mildred Wasserman MD  REFERRING PROVIDER: No ref. provider found    Chief Complaint   Patient presents with   • Actinic Keratosis     Follow up on AK of nose       Subjective   History of Present Illness:  Phoebe Padilla is a  52 y.o. female who returns regarding lesion of the nose.      I saw her on June 4, 2000 and regarding a skin lesion on the cephalic and left side of her nasal sidewall.  This was treated with topical chemotherapy cream for 2 weeks.  I saw her on September 17, 2018 and the lesion had completely resolved.  On August 3, she reported that the lesion of her right nasal tip was still crusting and flaking.  We discussed cryo versus topical chemo cream.  She proceeded with 2 weeks of topical chemo cream.  Today, she reports that the lesion has persisted.  She did use 2 weeks of chemo cream, and the area got very red.  The crusting and flaking persists.  She denies any nasal obstruction.    She is on Plavix.    Review of Systems:  Review of Systems   Constitutional: Negative for chills, fatigue, fever and unexpected weight change.   HENT: Negative for facial swelling.    Respiratory: Negative for cough, chest tightness and shortness of breath.    Cardiovascular: Negative for chest pain.   Musculoskeletal: Negative for neck pain.   Skin: Negative for color change.   Neurological: Negative for facial asymmetry.   Hematological: Negative for adenopathy. Does not bruise/bleed easily.       Past History:  Past Medical History:   Diagnosis Date   • Abnormal bleeding in menstrual cycle    • Arthritis    • IBS (irritable bowel syndrome)    • MRSA infection     12 yrs ago, on back   • Neck pain    • Pelvic pain      Past Surgical History:   Procedure Laterality Date   • LAPAROSCOPIC TUBAL LIGATION     • SALPINGECTOMY      right tube removed   • TONSILLECTOMY     • TOTAL LAPAROSCOPIC HYSTERECTOMY SALPINGO OOPHORECTOMY Bilateral 12/14/2017    Procedure: TOTAL LAPAROSCOPIC HYSTERECTOMY  BILATERAL SALPINGOOPHERECTOMY WITH DAVINCI SI ROBOT, CYSTO;  Surgeon: Korina Waters MD;  Location: [facility] OR;  Service:      History reviewed. No pertinent family history.  Social History     Tobacco Use   • Smoking status: Every Day     Packs/day: 1.00     Years: 37.00     Pack years: 37.00     Types: Cigarettes   • Smokeless tobacco: Never   Vaping Use   • Vaping Use: Never used   Substance Use Topics   • Alcohol use: No   • Drug use: No     Allergies:  Latex and Losartan potassium    Current Outpatient Medications:   •  AMBENONIUM CHLORIDE PO, Take  by mouth., Disp: , Rfl:   •  clopidogrel (PLAVIX) 75 MG tablet, Take 1 tablet by mouth Daily., Disp: , Rfl:   •  dicyclomine (BENTYL) 10 MG/5ML syrup, Take 1 capsule by mouth 3 (Three) Times a Day As Needed., Disp: , Rfl:   •  DULoxetine (CYMBALTA) 30 MG capsule, Take 1 capsule by mouth Daily., Disp: , Rfl:   •  famotidine (PEPCID) 40 MG/4ML solution injection, Take 1 tablet by mouth every night at bedtime., Disp: , Rfl:   •  metoprolol tartrate (LOPRESSOR) 50 MG tablet, TAKE ONE-HALF TABLET BY MOUTH TWICE A DAY, TAKE WITH FOOD, Disp: , Rfl:   •  pravastatin (PRAVACHOL) 40 MG tablet, , Disp: , Rfl:   •  QUEtiapine (SEROquel) 25 MG tablet, Take 1 tablet by mouth every night at bedtime., Disp: , Rfl:   •  Doxepin HCl 5 % cream, , Disp: , Rfl: 1  •  lidocaine (XYLOCAINE) 5 % ointment, , Disp: , Rfl: 1  •  ondansetron ODT (ZOFRAN ODT) 8 MG disintegrating tablet, Take 1 tablet by mouth Every 8 (Eight) Hours As Needed for Nausea or Vomiting., Disp: 15 tablet, Rfl: 0      Objective     Vital Signs:  Temp:  [97.2 °F (36.2 °C)] 97.2 °F (36.2 °C)  Heart Rate:  [75] 75  BP: (111)/(75) 111/75    Physical Exam:  Physical Exam  Vitals and nursing note reviewed.   Constitutional:       General: She is not in acute distress.     Appearance: She is well-developed. She is not diaphoretic.   HENT:      Head: Normocephalic and atraumatic.      Right Ear: External ear normal.       Left Ear: External ear normal.      Nose: Nose normal.     Eyes:      General: No scleral icterus.        Right eye: No discharge.         Left eye: No discharge.      Conjunctiva/sclera: Conjunctivae normal.      Pupils: Pupils are equal, round, and reactive to light.   Neck:      Thyroid: No thyromegaly.      Vascular: No JVD.      Trachea: No tracheal deviation.   Pulmonary:      Effort: Pulmonary effort is normal.      Breath sounds: No stridor.   Musculoskeletal:         General: No deformity. Normal range of motion.      Cervical back: Normal range of motion and neck supple.   Lymphadenopathy:      Cervical: No cervical adenopathy.   Skin:     General: Skin is warm and dry.      Coloration: Skin is not pale.      Findings: No erythema or rash.   Neurological:      Mental Status: She is alert and oriented to person, place, and time.      Cranial Nerves: No cranial nerve deficit.      Coordination: Coordination normal.   Psychiatric:         Speech: Speech normal.         Behavior: Behavior normal. Behavior is cooperative.         Thought Content: Thought content normal.         Judgment: Judgment normal.             Results Review:   I have reviewed her pathology results consistent with an actinic keratosis:      Assessment   Assessment:  1. Neoplasm of uncertain behavior of skin    2. Encounter for follow-up surveillance of skin cancer    3. Actinic keratosis        Plan   Plan:    This actinic keratosis is refractory to biopsy and chemotherapy.  He continues to bother her.  I discussed the options of cryotherapy, repeat chemo cream, shave biopsy, punch biopsy, versus excision.  She would like this removed, and we will set her up for the operating room.  Plan for excision with permanent section analysis and likely East-West flap.    Discussion of skin lesion. Discussed risks, benefits, alternatives, and possible complications of excision of the skin lesion with reconstruction utilizing local tissue  rearrangement, full-thickness skin grafting, or local interpolated flaps. Risks include, but are not limited too: bleeding, infection, hematoma, recurrence, need for additional procedures, flap failure, cosmetic deformity. Patient understands risks and would like to proceed with surgery.  Alternatives include doing nothing.    My findings and recommendations were discussed and questions were answered.     Nomi Cardenas MD  12/07/22  13:48 CST

## 2022-12-08 PROBLEM — D48.5 NEOPLASM OF UNCERTAIN BEHAVIOR OF SKIN: Status: ACTIVE | Noted: 2022-12-08

## 2022-12-27 ENCOUNTER — PRE-ADMISSION TESTING (OUTPATIENT)
Dept: PREADMISSION TESTING | Facility: HOSPITAL | Age: 52
End: 2022-12-27

## 2022-12-27 VITALS
RESPIRATION RATE: 16 BRPM | HEART RATE: 90 BPM | DIASTOLIC BLOOD PRESSURE: 82 MMHG | SYSTOLIC BLOOD PRESSURE: 134 MMHG | BODY MASS INDEX: 33.73 KG/M2 | WEIGHT: 227.74 LBS | HEIGHT: 69 IN | OXYGEN SATURATION: 98 %

## 2022-12-27 LAB
ANION GAP SERPL CALCULATED.3IONS-SCNC: 12 MMOL/L (ref 5–15)
BUN SERPL-MCNC: 12 MG/DL (ref 6–20)
BUN/CREAT SERPL: 17.6 (ref 7–25)
CALCIUM SPEC-SCNC: 9.3 MG/DL (ref 8.6–10.5)
CHLORIDE SERPL-SCNC: 105 MMOL/L (ref 98–107)
CO2 SERPL-SCNC: 23 MMOL/L (ref 22–29)
CREAT SERPL-MCNC: 0.68 MG/DL (ref 0.57–1)
DEPRECATED RDW RBC AUTO: 42.6 FL (ref 37–54)
EGFRCR SERPLBLD CKD-EPI 2021: 103.6 ML/MIN/1.73
ERYTHROCYTE [DISTWIDTH] IN BLOOD BY AUTOMATED COUNT: 13.1 % (ref 12.3–15.4)
GLUCOSE SERPL-MCNC: 90 MG/DL (ref 65–99)
HCT VFR BLD AUTO: 39.7 % (ref 34–46.6)
HGB BLD-MCNC: 13.5 G/DL (ref 12–15.9)
MCH RBC QN AUTO: 30.3 PG (ref 26.6–33)
MCHC RBC AUTO-ENTMCNC: 34 G/DL (ref 31.5–35.7)
MCV RBC AUTO: 89.2 FL (ref 79–97)
PLATELET # BLD AUTO: 265 10*3/MM3 (ref 140–450)
PMV BLD AUTO: 10.3 FL (ref 6–12)
POTASSIUM SERPL-SCNC: 3.9 MMOL/L (ref 3.5–5.2)
RBC # BLD AUTO: 4.45 10*6/MM3 (ref 3.77–5.28)
SODIUM SERPL-SCNC: 140 MMOL/L (ref 136–145)
WBC NRBC COR # BLD: 7.31 10*3/MM3 (ref 3.4–10.8)

## 2022-12-27 PROCEDURE — 93005 ELECTROCARDIOGRAM TRACING: CPT

## 2022-12-27 PROCEDURE — 36415 COLL VENOUS BLD VENIPUNCTURE: CPT

## 2022-12-27 PROCEDURE — 85027 COMPLETE CBC AUTOMATED: CPT

## 2022-12-27 PROCEDURE — 80048 BASIC METABOLIC PNL TOTAL CA: CPT

## 2022-12-27 PROCEDURE — 93010 ELECTROCARDIOGRAM REPORT: CPT | Performed by: INTERNAL MEDICINE

## 2022-12-27 NOTE — DISCHARGE INSTRUCTIONS
Before you come to the hospital        Arrival time: AS DIRECTED BY OFFICE     YOU MAY TAKE THE FOLLOWING MEDICATION(S) THE MORNING OF SURGERY WITH A SIP OF WATER: ok to take metoprolol morning of surgery with a sip of water            ALL OTHER HOME MEDICATION CHECK WITH YOUR PHYSICIAN (especially if   you are taking diabetes medicines or blood thinners)    Do not take any Erectile Dysfunction medications (EX: CIALIS, VIAGRA) 24 hours prior to surgery.      If you were given and instructed to use a germ- killing soap, use as directed the night before surgery and again the morning of surgery or as directed by your surgeon. (Use one-half of the bottle with each shower.)   See attached information for How to Use Chlorhexidine for Bathing if applicable.            Eating and drinking restrictions prior to scheduled arrival time    2 Hours before arrival time STOP   Drinking Clear liquids (water, apple juice-no pulp)     6 Hours before arrival time STOP   Milk or drinks that contain milk, full liquids    6 Hours before arrival time STOP   Light meals or foods, such as toast or cereal    8 Hours before arrival time STOP   Heavy foods, such as meat, fried foods, or fatty foods    (It is extremely important that you follow these guidelines to prevent delay or cancelation of your procedure)     Clear Liquids  Water and flavored water                                                                      Clear Fruit juices, such as cranberry juice and apple juice.  Black coffee (NO cream of any kind, including powdered).  Plain tea  Clear bouillon or broth.  Flavored gelatin.  Soda.  Gatorade or Powerade.  Full liquid examples  Juices that have pulp.  Frozen ice pops that contain fruit pieces.  Coffee with creamer  Milk.  Yogurt.                MANAGING PAIN AFTER SURGERY    We know you are probably wondering what your pain will be like after surgery.  Following surgery it is unrealistic to expect you will not have pain.    Pain is how our bodies let us know that something is wrong or cautions us to be careful.  That said, our goal is to make your pain tolerable.    Methods we may use to treat your pain include (oral or IV medications, PCAs, epidurals, nerve blocks, etc.)   While some procedures require IV pain medications for a short time after surgery, transitioning to pain medications by mouth allows for better management of pain.   Your nurse will encourage you to take oral pain medications whenever possible.  IV medications work almost immediately, but only last a short while.  Taking medications by mouth allows for a more constant level of medication in your blood stream for a longer period of time.      Once your pain is out of control it is harder to get back under control.  It is important you are aware when your next dose of pain medication is due.  If you are admitted, your nurse may write the time of your next dose on the white board in your room to help you remember.      We are interested in your pain and encourage you to inform us about aggravating factors during your visit.   Many times a simple repositioning every few hours can make a big difference.    If your physician says it is okay, do not let your pain prevent you from getting out of bed. Be sure to call your nurse for assistance prior to getting up so you do not fall.      Before surgery, please decide your tolerable pain goal.  These faces help describe the pain ratings we use on a 0-10 scale.   Be prepared to tell us your goal and whether or not you take pain or anxiety medications at home.          Preparing for Surgery  Preparing for surgery is an important part of your care. It can make things go more smoothly and help you avoid complications. The steps leading up to surgery may vary among hospitals. Follow all instructions given to you by your health care providers. Ask questions if you do not understand something. Talk about any concerns that you  have.  Here are some questions to consider asking before your surgery:  If my surgery is not an emergency (is elective), when would be the best time to have the surgery?  What arrangements do I need to make for work, home, or school?  What will my recovery be like? How long will it be before I can return to normal activities?  Will I need to prepare my home? Will I need to arrange care for me or my children?  Should I expect to have pain after surgery? What are my pain management options? Are there nonmedical options that I can try for pain?  Tell a health care provider about:  Any allergies you have.  All medicines you are taking, including vitamins, herbs, eye drops, creams, and over-the-counter medicines.  Any problems you or family members have had with anesthetic medicines.  Any blood disorders you have.  Any surgeries you have had.  Any medical conditions you have.  Whether you are pregnant or may be pregnant.  What are the risks?  The risks and complications of surgery depend on the specific procedure that you have. Discuss all the risks with your health care providers before your surgery. Ask about common surgical complications, which may include:  Infection.  Bleeding or a need for blood replacement (transfusion).  Allergic reactions to medicines.  Damage to surrounding nerves, tissues, or structures.  A blood clot.  Scarring.  Failure of the surgery to correct the problem.  Follow these instructions before the procedure:  Several days or weeks before your procedure  You may have a physical exam by your primary health care provider to make sure it is safe for you to have surgery.  You may have testing. This may include a chest X-ray, blood and urine tests, electrocardiogram (ECG), or other testing.  Ask your health care provider about:  Changing or stopping your regular medicines. This is especially important if you are taking diabetes medicines or blood thinners.  Taking medicines such as aspirin and  ibuprofen. These medicines can thin your blood. Do not take these medicines unless your health care provider tells you to take them.  Taking over-the-counter medicines, vitamins, herbs, and supplements.  Do not use any products that contain nicotine or tobacco, such as cigarettes and e-cigarettes. If you need help quitting, ask your health care provider.  Avoid alcohol.  Ask your health care provider if there are exercises you can do to prepare for surgery.  Eat a healthy diet.   Plan to have someone take you home from the hospital or clinic.  Plan to have a responsible adult care for you for at least 24 hours after you leave the hospital or clinic. This is important.  The day before your procedure  You may be given antibiotic medicine to take by mouth to help prevent infection. Take it as told by your health care provider.  You may be asked to shower with a germ-killing soap.  Follow instructions from your health care provider about eating and drinking restrictions. This includes gum, mints and hard candy.  Pack comfortable clothes according to your procedure.   The day of your procedure  You may need to take another shower with a germ-killing soap before you leave home in the morning.  With a small sip of water, take only the medicines that you are told to take.  Remove all jewelry including rings.   Leave anything you consider valuable at home except hearing aids if needed.  You do not need to bring your home medications into the hospital.   Do not wear any makeup, nail polish, powder, deodorant, lotion, hair accessories, or anything on your skin or body except your clothes.  If you will be staying in the hospital, bring a case to hold your glasses, contacts, or dentures. You may also want to bring your robe and non-skid footwear.       (Do not use denture adhesives since you will be asked to remove them during  surgery).   If you wear oxygen at home, bring it with you the day of surgery.  If instructed by your  health care provider, bring your sleep apnea device with you on the day of your surgery (if this applies to you).  You may want to leave your suitcase and sleep apnea device in the car until after surgery.   Arrive at the hospital as scheduled.  Bring a friend or family member with you who can help to answer questions and be present while you meet with your health care provider.  At the hospital  When you arrive at the hospital:  Go to registration located at the main entrance of the hospital. You will be registered and given a beeper and a sticker sheet. Take the stickers to the Outpatient nurses desk and place in the black tray. This is to notify staff that you have arrived. Then return to the lobby to wait.   When your beeper lights up and vibrates proceed through the double doors, under the stairs, and a member of the Outpatient Surgery staff will escort you to your preoperative room.  You may have to wear compression sleeves. These help to prevent blood clots and reduce swelling in your legs.  An IV may be inserted into one of your veins.              In the operating room, you may be given one or more of the following:        A medicine to help you relax (sedative).        A medicine to numb the area (local anesthetic).        A medicine to make you fall asleep (general anesthetic).        A medicine that is injected into an area of your body to numb everything below the                      injection site (regional anesthetic).  You may be given an antibiotic through your IV to help prevent infection.  Your surgical site will be marked or identified.    Contact a health care provider if you:  Develop a fever of more than 100.4°F (38°C) or other feelings of illness during the 48 hours before your surgery.  Have symptoms that get worse.  Have questions or concerns about your surgery.  Summary  Preparing for surgery can make the procedure go more smoothly and lower your risk of complications.  Before surgery,  make a list of questions and concerns to discuss with your surgeon. Ask about the risks and possible complications.  In the days or weeks before your surgery, follow all instructions from your health care provider. You may need to stop smoking, avoid alcohol, follow eating restrictions, and change or stop your regular medicines.  Contact your surgeon if you develop a fever or other signs of illness during the few days before your surgery.  This information is not intended to replace advice given to you by your health care provider. Make sure you discuss any questions you have with your health care provider.  Document Revised: 12/21/2018 Document Reviewed: 10/23/2018  Elsevier Patient Education © 2021 [employer]

## 2022-12-29 LAB
QT INTERVAL: 394 MS
QTC INTERVAL: 428 MS

## 2023-01-03 ENCOUNTER — ANESTHESIA (OUTPATIENT)
Dept: PERIOP | Facility: HOSPITAL | Age: 53
End: 2023-01-03
Payer: COMMERCIAL

## 2023-01-03 ENCOUNTER — ANESTHESIA EVENT (OUTPATIENT)
Dept: PERIOP | Facility: HOSPITAL | Age: 53
End: 2023-01-03
Payer: COMMERCIAL

## 2023-01-03 ENCOUNTER — HOSPITAL ENCOUNTER (OUTPATIENT)
Facility: HOSPITAL | Age: 53
Setting detail: HOSPITAL OUTPATIENT SURGERY
Discharge: HOME | End: 2023-01-03
Attending: OTOLARYNGOLOGY | Admitting: OTOLARYNGOLOGY
Payer: COMMERCIAL

## 2023-01-03 VITALS
OXYGEN SATURATION: 95 % | DIASTOLIC BLOOD PRESSURE: 74 MMHG | HEART RATE: 60 BPM | RESPIRATION RATE: 18 BRPM | TEMPERATURE: 97.4 F | SYSTOLIC BLOOD PRESSURE: 132 MMHG

## 2023-01-03 DIAGNOSIS — D48.5 NEOPLASM OF UNCERTAIN BEHAVIOR OF SKIN: ICD-10-CM

## 2023-01-03 PROCEDURE — 14060 TIS TRNFR E/N/E/L 10 SQ CM/<: CPT | Performed by: OTOLARYNGOLOGY

## 2023-01-03 PROCEDURE — 25010000002 ONDANSETRON PER 1 MG: Performed by: NURSE ANESTHETIST, CERTIFIED REGISTERED

## 2023-01-03 PROCEDURE — 88305 TISSUE EXAM BY PATHOLOGIST: CPT | Performed by: OTOLARYNGOLOGY

## 2023-01-03 PROCEDURE — 25010000002 CEFAZOLIN PER 500 MG: Performed by: OTOLARYNGOLOGY

## 2023-01-03 PROCEDURE — 25010000002 DEXAMETHASONE PER 1 MG: Performed by: ANESTHESIOLOGY

## 2023-01-03 PROCEDURE — 25010000002 PROPOFOL 10 MG/ML EMULSION: Performed by: NURSE ANESTHETIST, CERTIFIED REGISTERED

## 2023-01-03 PROCEDURE — 25010000002 FENTANYL CITRATE (PF) 100 MCG/2ML SOLUTION: Performed by: NURSE ANESTHETIST, CERTIFIED REGISTERED

## 2023-01-03 RX ORDER — LIDOCAINE HYDROCHLORIDE 10 MG/ML
0.5 INJECTION, SOLUTION EPIDURAL; INFILTRATION; INTRACAUDAL; PERINEURAL ONCE AS NEEDED
Status: DISCONTINUED | OUTPATIENT
Start: 2023-01-03 | End: 2023-01-03 | Stop reason: HOSPADM

## 2023-01-03 RX ORDER — HYDROCODONE BITARTRATE AND ACETAMINOPHEN 7.5; 325 MG/1; MG/1
1 TABLET ORAL ONCE AS NEEDED
Status: DISCONTINUED | OUTPATIENT
Start: 2023-01-03 | End: 2023-01-03 | Stop reason: HOSPADM

## 2023-01-03 RX ORDER — SODIUM CHLORIDE, SODIUM LACTATE, POTASSIUM CHLORIDE, CALCIUM CHLORIDE 600; 310; 30; 20 MG/100ML; MG/100ML; MG/100ML; MG/100ML
9 INJECTION, SOLUTION INTRAVENOUS CONTINUOUS
Status: DISCONTINUED | OUTPATIENT
Start: 2023-01-03 | End: 2023-01-03 | Stop reason: HOSPADM

## 2023-01-03 RX ORDER — HYDROCODONE BITARTRATE AND ACETAMINOPHEN 7.5; 325 MG/1; MG/1
1 TABLET ORAL EVERY 8 HOURS PRN
Qty: 9 TABLET | Refills: 0 | Status: SHIPPED | OUTPATIENT
Start: 2023-01-03 | End: 2023-01-05

## 2023-01-03 RX ORDER — NEOSTIGMINE METHYLSULFATE 5 MG/5 ML
SYRINGE (ML) INTRAVENOUS AS NEEDED
Status: DISCONTINUED | OUTPATIENT
Start: 2023-01-03 | End: 2023-01-03 | Stop reason: SURG

## 2023-01-03 RX ORDER — DROPERIDOL 2.5 MG/ML
0.62 INJECTION, SOLUTION INTRAMUSCULAR; INTRAVENOUS ONCE AS NEEDED
Status: DISCONTINUED | OUTPATIENT
Start: 2023-01-03 | End: 2023-01-03 | Stop reason: HOSPADM

## 2023-01-03 RX ORDER — SODIUM CHLORIDE 0.9 % (FLUSH) 0.9 %
10 SYRINGE (ML) INJECTION EVERY 12 HOURS SCHEDULED
Status: DISCONTINUED | OUTPATIENT
Start: 2023-01-03 | End: 2023-01-03 | Stop reason: HOSPADM

## 2023-01-03 RX ORDER — DEXTROSE MONOHYDRATE 25 G/50ML
12.5 INJECTION, SOLUTION INTRAVENOUS AS NEEDED
Status: DISCONTINUED | OUTPATIENT
Start: 2023-01-03 | End: 2023-01-03 | Stop reason: HOSPADM

## 2023-01-03 RX ORDER — OXYCODONE AND ACETAMINOPHEN 7.5; 325 MG/1; MG/1
2 TABLET ORAL EVERY 4 HOURS PRN
Status: DISCONTINUED | OUTPATIENT
Start: 2023-01-03 | End: 2023-01-03 | Stop reason: HOSPADM

## 2023-01-03 RX ORDER — LIDOCAINE HYDROCHLORIDE 20 MG/ML
INJECTION, SOLUTION EPIDURAL; INFILTRATION; INTRACAUDAL; PERINEURAL AS NEEDED
Status: DISCONTINUED | OUTPATIENT
Start: 2023-01-03 | End: 2023-01-03 | Stop reason: SURG

## 2023-01-03 RX ORDER — IBUPROFEN 600 MG/1
600 TABLET ORAL ONCE AS NEEDED
Status: DISCONTINUED | OUTPATIENT
Start: 2023-01-03 | End: 2023-01-03 | Stop reason: HOSPADM

## 2023-01-03 RX ORDER — ROCURONIUM BROMIDE 10 MG/ML
INJECTION, SOLUTION INTRAVENOUS AS NEEDED
Status: DISCONTINUED | OUTPATIENT
Start: 2023-01-03 | End: 2023-01-03 | Stop reason: SURG

## 2023-01-03 RX ORDER — FENTANYL CITRATE 50 UG/ML
INJECTION, SOLUTION INTRAMUSCULAR; INTRAVENOUS AS NEEDED
Status: DISCONTINUED | OUTPATIENT
Start: 2023-01-03 | End: 2023-01-03 | Stop reason: SURG

## 2023-01-03 RX ORDER — MIDAZOLAM HYDROCHLORIDE 1 MG/ML
1 INJECTION INTRAMUSCULAR; INTRAVENOUS
Status: DISCONTINUED | OUTPATIENT
Start: 2023-01-03 | End: 2023-01-03 | Stop reason: HOSPADM

## 2023-01-03 RX ORDER — FENTANYL CITRATE 50 UG/ML
25 INJECTION, SOLUTION INTRAMUSCULAR; INTRAVENOUS
Status: DISCONTINUED | OUTPATIENT
Start: 2023-01-03 | End: 2023-01-03 | Stop reason: HOSPADM

## 2023-01-03 RX ORDER — SCOLOPAMINE TRANSDERMAL SYSTEM 1 MG/1
1 PATCH, EXTENDED RELEASE TRANSDERMAL ONCE
Status: DISCONTINUED | OUTPATIENT
Start: 2023-01-03 | End: 2023-01-03 | Stop reason: HOSPADM

## 2023-01-03 RX ORDER — LIDOCAINE HYDROCHLORIDE AND EPINEPHRINE 10; 10 MG/ML; UG/ML
INJECTION, SOLUTION INFILTRATION; PERINEURAL AS NEEDED
Status: DISCONTINUED | OUTPATIENT
Start: 2023-01-03 | End: 2023-01-03 | Stop reason: HOSPADM

## 2023-01-03 RX ORDER — SODIUM CHLORIDE, SODIUM LACTATE, POTASSIUM CHLORIDE, CALCIUM CHLORIDE 600; 310; 30; 20 MG/100ML; MG/100ML; MG/100ML; MG/100ML
1000 INJECTION, SOLUTION INTRAVENOUS CONTINUOUS
Status: DISCONTINUED | OUTPATIENT
Start: 2023-01-03 | End: 2023-01-03 | Stop reason: HOSPADM

## 2023-01-03 RX ORDER — DEXAMETHASONE SODIUM PHOSPHATE 4 MG/ML
4 INJECTION, SOLUTION INTRA-ARTICULAR; INTRALESIONAL; INTRAMUSCULAR; INTRAVENOUS; SOFT TISSUE ONCE AS NEEDED
Status: COMPLETED | OUTPATIENT
Start: 2023-01-03 | End: 2023-01-03

## 2023-01-03 RX ORDER — NALOXONE HCL 0.4 MG/ML
0.4 VIAL (ML) INJECTION AS NEEDED
Status: DISCONTINUED | OUTPATIENT
Start: 2023-01-03 | End: 2023-01-03 | Stop reason: HOSPADM

## 2023-01-03 RX ORDER — SODIUM CHLORIDE 0.9 % (FLUSH) 0.9 %
10 SYRINGE (ML) INJECTION AS NEEDED
Status: DISCONTINUED | OUTPATIENT
Start: 2023-01-03 | End: 2023-01-03 | Stop reason: HOSPADM

## 2023-01-03 RX ORDER — LABETALOL HYDROCHLORIDE 5 MG/ML
5 INJECTION, SOLUTION INTRAVENOUS
Status: DISCONTINUED | OUTPATIENT
Start: 2023-01-03 | End: 2023-01-03 | Stop reason: HOSPADM

## 2023-01-03 RX ORDER — BUPIVACAINE HCL/0.9 % NACL/PF 0.1 %
2 PLASTIC BAG, INJECTION (ML) EPIDURAL ONCE
Status: COMPLETED | OUTPATIENT
Start: 2023-01-03 | End: 2023-01-03

## 2023-01-03 RX ORDER — PROPOFOL 10 MG/ML
VIAL (ML) INTRAVENOUS AS NEEDED
Status: DISCONTINUED | OUTPATIENT
Start: 2023-01-03 | End: 2023-01-03 | Stop reason: SURG

## 2023-01-03 RX ORDER — OXYCODONE AND ACETAMINOPHEN 10; 325 MG/1; MG/1
1 TABLET ORAL ONCE AS NEEDED
Status: DISCONTINUED | OUTPATIENT
Start: 2023-01-03 | End: 2023-01-03 | Stop reason: HOSPADM

## 2023-01-03 RX ORDER — ONDANSETRON 2 MG/ML
INJECTION INTRAMUSCULAR; INTRAVENOUS AS NEEDED
Status: DISCONTINUED | OUTPATIENT
Start: 2023-01-03 | End: 2023-01-03 | Stop reason: SURG

## 2023-01-03 RX ORDER — ONDANSETRON 2 MG/ML
4 INJECTION INTRAMUSCULAR; INTRAVENOUS ONCE AS NEEDED
Status: DISCONTINUED | OUTPATIENT
Start: 2023-01-03 | End: 2023-01-03 | Stop reason: HOSPADM

## 2023-01-03 RX ORDER — SODIUM CHLORIDE 0.9 % (FLUSH) 0.9 %
3 SYRINGE (ML) INJECTION AS NEEDED
Status: DISCONTINUED | OUTPATIENT
Start: 2023-01-03 | End: 2023-01-03 | Stop reason: HOSPADM

## 2023-01-03 RX ORDER — MAGNESIUM HYDROXIDE 1200 MG/15ML
LIQUID ORAL AS NEEDED
Status: DISCONTINUED | OUTPATIENT
Start: 2023-01-03 | End: 2023-01-03 | Stop reason: HOSPADM

## 2023-01-03 RX ORDER — FLUMAZENIL 0.1 MG/ML
0.2 INJECTION INTRAVENOUS AS NEEDED
Status: DISCONTINUED | OUTPATIENT
Start: 2023-01-03 | End: 2023-01-03 | Stop reason: HOSPADM

## 2023-01-03 RX ADMIN — ONDANSETRON 4 MG: 2 INJECTION INTRAMUSCULAR; INTRAVENOUS at 07:37

## 2023-01-03 RX ADMIN — ROCURONIUM BROMIDE 40 MG: 10 SOLUTION INTRAVENOUS at 07:17

## 2023-01-03 RX ADMIN — LIDOCAINE HYDROCHLORIDE 100 MG: 20 INJECTION, SOLUTION EPIDURAL; INFILTRATION; INTRACAUDAL; PERINEURAL at 07:53

## 2023-01-03 RX ADMIN — GLYCOPYRROLATE 0.4 MG: 0.2 INJECTION INTRAMUSCULAR; INTRAVENOUS at 07:51

## 2023-01-03 RX ADMIN — FENTANYL CITRATE 100 MCG: 50 INJECTION INTRAMUSCULAR; INTRAVENOUS at 07:15

## 2023-01-03 RX ADMIN — DEXAMETHASONE SODIUM PHOSPHATE 4 MG: 4 INJECTION INTRA-ARTICULAR; INTRALESIONAL; INTRAMUSCULAR; INTRAVENOUS; SOFT TISSUE at 07:00

## 2023-01-03 RX ADMIN — Medication 2 G: at 07:25

## 2023-01-03 RX ADMIN — SODIUM CHLORIDE, POTASSIUM CHLORIDE, SODIUM LACTATE AND CALCIUM CHLORIDE 1000 ML: 600; 310; 30; 20 INJECTION, SOLUTION INTRAVENOUS at 06:13

## 2023-01-03 RX ADMIN — Medication 3.5 MG: at 07:51

## 2023-01-03 RX ADMIN — PROPOFOL 150 MG: 10 INJECTION, EMULSION INTRAVENOUS at 07:17

## 2023-01-03 RX ADMIN — SCOPALAMINE 1 PATCH: 1 PATCH, EXTENDED RELEASE TRANSDERMAL at 07:00

## 2023-01-03 NOTE — ANESTHESIA PREPROCEDURE EVALUATION
Anesthesia Evaluation     Patient summary reviewed   no history of anesthetic complications:  NPO Solid Status: > 8 hours             Airway   Mallampati: II  Dental          Pulmonary    (+) a smoker, COPD,   (-) asthma, sleep apnea  Cardiovascular   Exercise tolerance: excellent (>7 METS)    (+) hypertension, hyperlipidemia,   (-) pacemaker, past MI, angina, cardiac stents      Neuro/Psych  (+) TIA,    (-) seizures, CVA  GI/Hepatic/Renal/Endo    (+) obesity,  GERD,    (-) liver disease, no renal disease, diabetes    Musculoskeletal     Abdominal    Substance History      OB/GYN          Other                        Anesthesia Plan    ASA 3     general     intravenous induction     Anesthetic plan, risks, benefits, and alternatives have been provided, discussed and informed consent has been obtained with: patient.        CODE STATUS:

## 2023-01-03 NOTE — OP NOTE
PATIENT NAME:  Name    DATE:  01/03/23    PREOPERATIVE DIAGNOSIS: Actinic keratosis versus squamous cell carcinoma of the right nasal tip    POSTOPERATIVE DIAGNOSIS: Actinic keratosis versus squamous cell carcinoma of the right nasal    PROCEDURE: 1) excision of neoplasm uncertain behavior skin of right nasal tip, 8 mm x 8 mm   2) Local tissue rearrangement of nose (East-West flap), 1.5 cm x 3.0 cm    SURGEON:  Nomi Cardenas MD, FACS    FACILITY: [facility] Operating Room    ANESTHESIA: General    DICTATED BY:  Nomi Cardenas MD, FACS    IVF: Per anesthesia    EBL: 20 cc    IMPLANTS: None    DRAINS: None    SPECIMENS: Actinic keratosis versus squamous cell carcinoma of the nose, stitch at 12:00-permanent    COMPLICATIONS: None apparent    INDICATIONS FOR SURGERY: Ms. Cobos presented with a lesion on the right nasal tip that recurred following her biopsy and topical treatment.  She surgical excision of pathological analysis.    OPERATIVE FINDINGS:   Lesion: 3 mm x 3 mm  Margins: 2.5 mm  Defect: 8 mm x 8 mm  Flap and Defect: 1.5 cm x 3.0 cm  Depth: Through dermis      OPERATIVE DETAILS:    After patient verification and informed consent was obtained, the patient was taken to the operating room and laid supine on the operative table.  After the induction of general anesthesia, the skin was infiltrated with 1% lidocaine of 1:100,000 epinephrine.  The skin was sterilely prepped with Betadine and the patient was sterilely draped.    The lesion was marked, and the above listed margins were drawn around this.  This was converted to an East-West flap type design.  The superior aspect was incised utilizing a #15 blade, including the superior sanguineous deformity.  A marking stitch was placed at 12:00 and this was removed in the subcutaneous plane.  This was sent for permanent section analysis.      At the nasal tip, and inferiorly apexed triangular surgical excision site was marked and surgical pen.  The  skin was incised using a 15 blade and undermined in the submuscular plane.  Hemostasis was obtained with monopolar cautery set at 15.      The flaps were then elevated in the submuscular plane laterally for approximately 1.5 cm.  Hemostasis was again obtained utilizing monopolar cautery set at 15.  The flaps were then advanced and closed utilizing deep 5-0 undyed Monocryl suture.    The skin was further closed utilizing simple, interrupted 6-0 Prolene    Antibiotic ointment was placed to the incisions and flap.    The patient was weaned from anesthesia, and transferred to the PACU for recovery without apparent complication.        DISPOSITION:  The procedures were completed without complication and tolerated well.  The patient was released in the company of her ex- to return home in satisfactory condition.  A follow-up appointment will be scheduled, routine post-op medications prescribed (if required), and post-op instructions were given to the responsible party.           Nomi Cardenas MD, FACS  Board Certified Facial Plastic and Reconstructive Surgery  Board Certified Otolaryngology -- Head and Neck Surgery    Electronically signed by Nomi Cardenas MD, 01/03/23, 7:57 AM CST.

## 2023-01-03 NOTE — ANESTHESIA PROCEDURE NOTES
Airway  Urgency: elective    Date/Time: 1/3/2023 7:20 AM  Airway not difficult    General Information and Staff    Patient location during procedure: OR  CRNA/CAA: Bora Davila CRNA    Indications and Patient Condition  Indications for airway management: airway protection    Preoxygenated: yes  MILS maintained throughout  Mask difficulty assessment: 1 - vent by mask    Final Airway Details  Final airway type: endotracheal airway      Successful airway: ETT  Cuffed: yes   Successful intubation technique: direct laryngoscopy  Endotracheal tube insertion site: oral  Blade: Moy  Blade size: 2  ETT size (mm): 7.0  Cormack-Lehane Classification: grade I - full view of glottis  Placement verified by: chest auscultation and capnometry   Cuff volume (mL): 5  Measured from: gums  ETT/EBT to gums (cm): 21  Number of attempts at approach: 1  Assessment: lips, teeth, and gum same as pre-op and atraumatic intubation

## 2023-01-03 NOTE — ANESTHESIA POSTPROCEDURE EVALUATION
Patient: Phoebe GONZALEZsimon    Procedure Summary     Date: 01/03/23 Room / Location:  [facility] OR  /  [facility]    Anesthesia Start: 0715 Anesthesia Stop: 0800    Procedure: Excision of skin lesion of right nasal tip with East-West flap reconstruction.  Permanent section analysis. (Right) Diagnosis:       Neoplasm of uncertain behavior of skin      (Neoplasm of uncertain behavior of skin [D48.5])    Surgeons: Nomi Cardenas MD Provider: Bora Davila CRNA    Anesthesia Type: general ASA Status: 3          Anesthesia Type: general    Vitals  Vitals Value Taken Time   /66 01/03/23 0812   Temp 97.4 °F (36.3 °C) 01/03/23 0758   Pulse 70 01/03/23 0817   Resp 16 01/03/23 0812   SpO2 94 % 01/03/23 0817   Vitals shown include unvalidated device data.        Post Anesthesia Care and Evaluation    Patient location during evaluation: PACU  Patient participation: complete - patient participated  Level of consciousness: awake and alert  Pain management: adequate    Airway patency: patent  Anesthetic complications: No anesthetic complications    Cardiovascular status: acceptable  Respiratory status: acceptable  Hydration status: acceptable    Comments: Blood pressure 124/67, pulse 66, temperature 97.4 °F (36.3 °C), resp. rate 18, last menstrual period 12/07/2017, SpO2 95 %, not currently breastfeeding.    Pt discharged from PACU based on hien score >8

## 2023-01-04 PROBLEM — Z12.11 ENCOUNTER FOR SCREENING FOR MALIGNANT NEOPLASM OF COLON: Status: ACTIVE | Noted: 2023-01-04

## 2023-01-04 PROBLEM — Z79.01 ANTICOAGULATED: Status: ACTIVE | Noted: 2023-01-04

## 2023-01-04 NOTE — PROGRESS NOTES
Primary Physician: Mildred Wasserman MD    Chief Complaint   Patient presents with   • Colon Cancer Screening     Pt presents today for evaluation for colonoscopy-pt's last colon was 12/12/2016 by Dr. Yovani Contreras; Family history of colon cancer and colon polyps       Subjective     Phoebe Padilla is a 52 y.o. female.    HPI   Colonoscopy Surveillance  Maternal grandfather had colon cancer in 60's.  Mother has UC with all colon removed.  Her son has colon polyps at age 30.  Pt reports that a couple of weeks ago she had a possible hemorrhoid come out of rectum and had small bright red bleeding that happened once and then resolved.  No change in bowel pattern.  She takes Bentyl BID and that has helped her with IBS in the past.    Endoscopy 12/12/2016 per Dr Yovani Contreras: normal esophagus, stomach, and duodenum. Small intestine biopsy    Colonoscopy 12/12/2016 per Dr Yovani Contreras: mucosa normal throughout entire examined colon. Negative random colon biopsies.    She does take Plavix and that is regulated by Dr Wasserman.    Past Medical History:   Diagnosis Date   • Abnormal bleeding in menstrual cycle    • Arthritis    • COPD (chronic obstructive pulmonary disease) (HCC)    • COVID     x2   • Family history of colon cancer    • Family history of colonic polyps    • Fibromyalgia    • IBS (irritable bowel syndrome)    • MRSA infection     12 yrs ago, on back   • Neck pain    • Pelvic pain    • TIA (transient ischemic attack)        Past Surgical History:   Procedure Laterality Date   • COLONOSCOPY  12/12/2016    Dr. Yovani Contreras-The mucosa appeared normal throughout the entire examined ileum and colon; Random colon biopsies obtained throughout the colon for evaluation for microscopic colitis; Repeat 10 years   • ENDOSCOPY  12/12/2016    Dr. Yovani Contreras-Normal upper GI tract endoscopically; S/p gastric and duodenal biopsies   • LAPAROSCOPIC TUBAL LIGATION     • SALPINGECTOMY Right    • SKIN LESION EXCISION Right 01/03/2023     Procedure: Excision of skin lesion of right nasal tip with East-West flap reconstruction.  Permanent section analysis.;  Surgeon: Nomi Cardenas MD;  Location:  [facility] OR;  Service: ENT;  Laterality: Right;   • TONSILLECTOMY AND ADENOIDECTOMY     • TOTAL LAPAROSCOPIC HYSTERECTOMY SALPINGO OOPHORECTOMY Bilateral 12/14/2017    Procedure: TOTAL LAPAROSCOPIC HYSTERECTOMY BILATERAL SALPINGOOPHERECTOMY WITH DAVINCI SI ROBOT, CYSTO;  Surgeon: Korina Waters MD;  Location:  [facility] OR;  Service:    • TUBAL ABDOMINAL LIGATION  1991        Current Outpatient Medications:   •  clopidogrel (PLAVIX) 75 MG tablet, Take 1 tablet by mouth Daily., Disp: , Rfl:   •  dicyclomine (BENTYL) 10 MG/5ML syrup, Take 1 capsule by mouth 3 (Three) Times a Day As Needed., Disp: , Rfl:   •  DULoxetine (CYMBALTA) 30 MG capsule, Take 1 capsule by mouth Daily., Disp: , Rfl:   •  famotidine (PEPCID) 40 MG/4ML solution injection, Take 1 tablet by mouth every night at bedtime., Disp: , Rfl:   •  metoprolol tartrate (LOPRESSOR) 50 MG tablet, TAKE ONE-HALF TABLET BY MOUTH TWICE A DAY, TAKE WITH FOOD, Disp: , Rfl:   •  pravastatin (PRAVACHOL) 40 MG tablet, Take 40 mg by mouth Daily., Disp: , Rfl:   •  QUEtiapine (SEROquel) 25 MG tablet, Take 1 tablet by mouth every night at bedtime., Disp: , Rfl:     Allergies   Allergen Reactions   • Losartan Potassium Angioedema   • Latex Nausea And Vomiting       Social History     Socioeconomic History   • Marital status:    Tobacco Use   • Smoking status: Every Day     Packs/day: 1.00     Years: 37.00     Pack years: 37.00     Types: Cigarettes   • Smokeless tobacco: Never   Vaping Use   • Vaping Use: Never used   Substance and Sexual Activity   • Alcohol use: Yes     Comment: Occasionally   • Drug use: No   • Sexual activity: Yes     Partners: Male       Family History   Problem Relation Age of Onset   • Colon polyps Mother         < 60 years of age   • Inflammatory bowel disease Mother    • Irritable  bowel syndrome Mother    • Ulcerative colitis Mother    • Esophageal cancer Father    • Alcohol abuse Father    • Lung cancer Father    • Stomach cancer Maternal Grandmother    • Colon cancer Maternal Grandfather         In his late 60's/early 70's   • Colon polyps Son         < 60 years of age   • Liver cancer Neg Hx    • Liver disease Neg Hx        Review of Systems   Constitutional: Negative for unexpected weight change.   Respiratory: Negative for shortness of breath.    Cardiovascular: Negative for chest pain.       Objective     /78 (BP Location: Left arm, Patient Position: Sitting, Cuff Size: Adult)   Pulse 70   Temp 97.5 °F (36.4 °C) (Infrared)   Ht 172.7 cm (68\")   Wt 103 kg (227 lb)   LMP 12/07/2017   SpO2 97%   Breastfeeding No   BMI 34.52 kg/m²     Physical Exam  Vitals reviewed.   Constitutional:       Appearance: Normal appearance.   Cardiovascular:      Rate and Rhythm: Normal rate and regular rhythm.      Heart sounds: Normal heart sounds.   Pulmonary:      Effort: Pulmonary effort is normal.      Breath sounds: Normal breath sounds.   Neurological:      Mental Status: She is alert.         Lab Results - Last 18 Months   Lab Units 12/27/22  1603   GLUCOSE mg/dL 90   BUN mg/dL 12   CREATININE mg/dL 0.68   SODIUM mmol/L 140   POTASSIUM mmol/L 3.9   CHLORIDE mmol/L 105   CO2 mmol/L 23.0       Lab Results - Last 18 Months   Lab Units 12/27/22  1603   HEMOGLOBIN g/dL 13.5   HEMATOCRIT % 39.7   MCV fL 89.2   WBC 10*3/mm3 7.31   RDW % 13.1   MPV fL 10.3   PLATELETS 10*3/mm3 265           IMPRESSION/PLAN:    Assessment & Plan      Problem List Items Addressed This Visit        Coag and Thromboembolic    Anticoagulated    Overview     plavix therapy daily, Hx of TIA            Family History    FH: colon cancer - Primary    Overview     Maternal grandfather in his 60's         Relevant Orders    Case Request (Completed)    FH: colon polyps    Overview     Son with polyps at age 30          Relevant Orders    Case Request (Completed)    Family history of chronic ulcerative colitis    Overview     mother         Relevant Orders    Case Request (Completed)     Colonoscopy per Dr Fry    Will call Dr Wasserman for approval to hold Plavix 5 days prior to procedure.            ..The risks, benefits, and alternatives of colonoscopy were reviewed with the patient today.  Risks including perforation of the colon possibly requiring surgery or colostomy.  Additional risks include risk of bleeding from biopsies or removal of colon tissue.  There is also the risk of a drug reaction or problems with anesthesia.  This will be discussed with the further by the anesthesia team on the day of the procedure.  Lastly there is a possibility of missing a colon polyp or cancer.  The benefits include the diagnosis and management of disease of the colon and rectum.  Alternatives to colonoscopy include barium enema, laboratory testing, radiographic evaluation, or no intervention.  The patient verbalizes understanding and agrees.    In accordance with requirements under the Affordable Care Act, [facility] has provided pricing for all hospital services and items on each of its websites. However, a patient's actual cost may differ based on the services the patient receives to meet individual healthcare needs and based on the benefits provided under the patient’s insurance coverage.        Gloria Tobias, APRN  01/05/23  08:50 CST    Part of this note may be an electronic transcription/translation of spoken language to printed text.

## 2023-01-05 ENCOUNTER — TELEPHONE (OUTPATIENT)
Dept: GASTROENTEROLOGY | Facility: CLINIC | Age: 53
End: 2023-01-05
Payer: COMMERCIAL

## 2023-01-05 ENCOUNTER — OFFICE VISIT (OUTPATIENT)
Dept: GASTROENTEROLOGY | Facility: CLINIC | Age: 53
End: 2023-01-05
Payer: COMMERCIAL

## 2023-01-05 VITALS
BODY MASS INDEX: 34.4 KG/M2 | SYSTOLIC BLOOD PRESSURE: 126 MMHG | DIASTOLIC BLOOD PRESSURE: 78 MMHG | HEART RATE: 70 BPM | OXYGEN SATURATION: 97 % | WEIGHT: 227 LBS | TEMPERATURE: 97.5 F | HEIGHT: 68 IN

## 2023-01-05 DIAGNOSIS — Z79.01 ANTICOAGULATED: ICD-10-CM

## 2023-01-05 DIAGNOSIS — Z80.0 FH: COLON CANCER: Primary | ICD-10-CM

## 2023-01-05 DIAGNOSIS — Z83.79 FAMILY HISTORY OF CHRONIC ULCERATIVE COLITIS: ICD-10-CM

## 2023-01-05 DIAGNOSIS — Z83.71 FH: COLON POLYPS: ICD-10-CM

## 2023-01-05 PROBLEM — Z83.719 FH: COLON POLYPS: Status: ACTIVE | Noted: 2023-01-05

## 2023-01-05 PROCEDURE — S0285 CNSLT BEFORE SCREEN COLONOSC: HCPCS | Performed by: NURSE PRACTITIONER

## 2023-01-05 RX ORDER — SODIUM, POTASSIUM,MAG SULFATES 17.5-3.13G
1 SOLUTION, RECONSTITUTED, ORAL ORAL EVERY 12 HOURS
Qty: 354 ML | Refills: 0 | Status: ON HOLD | OUTPATIENT
Start: 2023-01-05 | End: 2023-01-26

## 2023-01-05 NOTE — TELEPHONE ENCOUNTER
Received clearance from Dr. Wasserman for pt to hold Plavix with last dose on 1/20. No lovenox required. Spoke with pt and she vu. Pt told me in office and on the phone she is not able to take asa 81mg. She will call back with any questions.

## 2023-01-06 ENCOUNTER — PATIENT ROUNDING (BHMG ONLY) (OUTPATIENT)
Dept: GASTROENTEROLOGY | Facility: CLINIC | Age: 53
End: 2023-01-06
Payer: COMMERCIAL

## 2023-01-06 NOTE — PROGRESS NOTES
January 6, 2023    Hello, may I speak with Phoebe Padilla?    My name is       I am  with [facility]  [address], [address]  [place] [address] [phone].    Before we get started may I verify your date of birth? 1970    I am calling to officially welcome you to our practice and ask about your recent visit. Is this a good time to talk? yes    Tell me about your visit with us. What things went well?  Everything was good. She was glad we were able to obtain clearance for her Plavix so quickly.       We're always looking for ways to make our patients' experiences even better. Do you have recommendations on ways we may improve?  no    Overall were you satisfied with your first visit to our practice? yes       I appreciate you taking the time to speak with me today. Is there anything else I can do for you? no      Thank you, and have a great day.

## 2023-01-12 ENCOUNTER — OFFICE VISIT (OUTPATIENT)
Dept: OTOLARYNGOLOGY | Facility: CLINIC | Age: 53
End: 2023-01-12
Payer: COMMERCIAL

## 2023-01-12 VITALS — HEART RATE: 86 BPM | DIASTOLIC BLOOD PRESSURE: 74 MMHG | SYSTOLIC BLOOD PRESSURE: 131 MMHG | TEMPERATURE: 97.9 F

## 2023-01-12 DIAGNOSIS — L57.0 ACTINIC KERATOSIS: ICD-10-CM

## 2023-01-12 DIAGNOSIS — Z79.02 ANTIPLATELET OR ANTITHROMBOTIC LONG-TERM USE: Primary | ICD-10-CM

## 2023-01-12 DIAGNOSIS — D04.39 SQUAMOUS CELL CARCINOMA IN SITU (SCCIS) OF SKIN OF NOSE: ICD-10-CM

## 2023-01-12 PROCEDURE — 17110 DESTRUCTION B9 LES UP TO 14: CPT | Performed by: OTOLARYNGOLOGY

## 2023-01-12 PROCEDURE — 99024 POSTOP FOLLOW-UP VISIT: CPT | Performed by: OTOLARYNGOLOGY

## 2023-01-12 NOTE — PROGRESS NOTES
Preoperative Diagnosis: Actinic keratosis of left nasal tip    Postoperative Diagnosis: Same    Procedure: Destruction with Cryotherapy     Provider: Nomi Cardenas MD, FACS    Anesthesia: None    Location: [place] [facility] office    Complications: None    Details of Procedure:     Patient identity was verified and consent was signed. The lesion was treated with 2 pulses of 8 second duration each; allowing the skin to completely thaw between pulses. The patient tolerated the procedure without complication.    Nomi Cardenas MD  01/12/23  10:20 CST

## 2023-01-12 NOTE — PROGRESS NOTES
CC/Reason for visit: Phoebe Padilla returns to the office following excision of an actinic keratosis versus squamous cell carcinoma of the right nasal tip with E status West flap reconstruction on January 3, 2023.    SUBJECTIVE:  ***    OBJECTIVE:  /74   Pulse 86   Temp 97.9 °F (36.6 °C) (Temporal)   LMP 12/07/2017   ***    Pathology:      ASSESSMENT:  Diagnoses and all orders for this visit:    1. Antiplatelet or antithrombotic long-term use (Primary)    2. 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

## 2023-01-12 NOTE — PROGRESS NOTES
PRIMARY CARE PROVIDER: Mildred Wasserman MD  REFERRING PROVIDER: No ref. provider found    Chief Complaint   Patient presents with   • Skin Lesion     1 week post op on Exc of skin lesion to nasal tip. Suture removal          Subjective   History of Present Illness:  Phoebe Padilla is a  52 y.o. female who presents for consultation regarding a skin lesion of the left nasal tip.  The lesion has the following characteristics:    Location: left nasal tip  Quality: palpable lesion, similar to how the squamous cell carcinoma of the right nasal tip started  Severity: mild  Duration: months  Timing: constant  Modifying Factors: none  Associated Signs & Symptoms: no bleeding    She underwent excision of squamous cell carcinoma in situ of the right nasal tip with East-West flap reconstruction on January 3, 2023.    Review of Systems:  Review of Systems   Constitutional: Negative for chills, fatigue, fever and unexpected weight change.   HENT: Negative for facial swelling.    Respiratory: Negative for cough, chest tightness and shortness of breath.    Cardiovascular: Negative for chest pain.   Musculoskeletal: Negative for neck pain.   Skin: Positive for wound. Negative for color change.   Neurological: Negative for facial asymmetry.   Hematological: Negative for adenopathy. Does not bruise/bleed easily.       Past History:  Past Medical History:   Diagnosis Date   • Abnormal bleeding in menstrual cycle    • Arthritis    • COPD (chronic obstructive pulmonary disease) (HCC)    • COVID     x2   • Family history of colon cancer    • Family history of colonic polyps    • Fibromyalgia    • IBS (irritable bowel syndrome)    • MRSA infection     12 yrs ago, on back   • Neck pain    • Pelvic pain    • TIA (transient ischemic attack)      Past Surgical History:   Procedure Laterality Date   • COLONOSCOPY  12/12/2016    Dr. Yovani Contreras-The mucosa appeared normal throughout the entire examined ileum and colon; Random colon biopsies  obtained throughout the colon for evaluation for microscopic colitis; Repeat 10 years   • ENDOSCOPY  12/12/2016    Dr. Yovani Contreras-Normal upper GI tract endoscopically; S/p gastric and duodenal biopsies   • LAPAROSCOPIC TUBAL LIGATION     • SALPINGECTOMY Right    • SKIN LESION EXCISION Right 01/03/2023    Procedure: Excision of skin lesion of right nasal tip with East-West flap reconstruction.  Permanent section analysis.;  Surgeon: Nomi Cardenas MD;  Location:  [facility] OR;  Service: ENT;  Laterality: Right;   • TONSILLECTOMY AND ADENOIDECTOMY     • TOTAL LAPAROSCOPIC HYSTERECTOMY SALPINGO OOPHORECTOMY Bilateral 12/14/2017    Procedure: TOTAL LAPAROSCOPIC HYSTERECTOMY BILATERAL SALPINGOOPHERECTOMY WITH DAVINCI SI ROBOT, CYSTO;  Surgeon: Korina Waters MD;  Location: [facility] OR;  Service:    • TUBAL ABDOMINAL LIGATION  1991     Family History   Problem Relation Age of Onset   • Colon polyps Mother         < 60 years of age   • Inflammatory bowel disease Mother    • Irritable bowel syndrome Mother    • Ulcerative colitis Mother    • Esophageal cancer Father    • Alcohol abuse Father    • Lung cancer Father    • Stomach cancer Maternal Grandmother    • Colon cancer Maternal Grandfather         In his late 60's/early 70's   • Colon polyps Son         < 60 years of age   • Liver cancer Neg Hx    • Liver disease Neg Hx      Social History     Tobacco Use   • Smoking status: Every Day     Packs/day: 1.00     Years: 37.00     Pack years: 37.00     Types: Cigarettes   • Smokeless tobacco: Never   Vaping Use   • Vaping Use: Never used   Substance Use Topics   • Alcohol use: Yes     Comment: Occasionally   • Drug use: No     Allergies:  Losartan potassium and Latex    Current Outpatient Medications:   •  clopidogrel (PLAVIX) 75 MG tablet, Take 1 tablet by mouth Daily., Disp: , Rfl:   •  dicyclomine (BENTYL) 10 MG/5ML syrup, Take 1 capsule by mouth 3 (Three) Times a Day As Needed., Disp: , Rfl:   •  DULoxetine (CYMBALTA)  30 MG capsule, Take 1 capsule by mouth Daily., Disp: , Rfl:   •  famotidine (PEPCID) 40 MG/4ML solution injection, Take 1 tablet by mouth every night at bedtime., Disp: , Rfl:   •  metoprolol tartrate (LOPRESSOR) 50 MG tablet, TAKE ONE-HALF TABLET BY MOUTH TWICE A DAY, TAKE WITH FOOD, Disp: , Rfl:   •  pravastatin (PRAVACHOL) 40 MG tablet, Take 40 mg by mouth Daily., Disp: , Rfl:   •  QUEtiapine (SEROquel) 25 MG tablet, Take 1 tablet by mouth every night at bedtime., Disp: , Rfl:   •  sodium-potassium-magnesium sulfates (Suprep Bowel Prep Kit) 17.5-3.13-1.6 GM/177ML solution oral solution, Take 1 bottle by mouth Every 12 (Twelve) Hours. Take as directed, Disp: 354 mL, Rfl: 0    Objective     Vital Signs:  Temp:  [97.9 °F (36.6 °C)] 97.9 °F (36.6 °C)  Heart Rate:  [86] 86  BP: (131)/(74) 131/74    Physical Exam:  Physical Exam  Vitals and nursing note reviewed.   Constitutional:       General: She is not in acute distress.     Appearance: She is well-developed. She is not diaphoretic.   HENT:      Head: Normocephalic and atraumatic.      Right Ear: External ear normal.      Left Ear: External ear normal.      Nose: Nose normal.     Eyes:      General: No scleral icterus.        Right eye: No discharge.         Left eye: No discharge.      Conjunctiva/sclera: Conjunctivae normal.      Pupils: Pupils are equal, round, and reactive to light.   Neck:      Thyroid: No thyromegaly.      Vascular: No JVD.      Trachea: No tracheal deviation.   Pulmonary:      Effort: Pulmonary effort is normal.      Breath sounds: No stridor.   Musculoskeletal:         General: No deformity. Normal range of motion.      Cervical back: Normal range of motion and neck supple.   Lymphadenopathy:      Cervical: No cervical adenopathy.   Skin:     General: Skin is warm and dry.      Coloration: Skin is not pale.      Findings: No erythema or rash.   Neurological:      Mental Status: She is alert and oriented to person, place, and time.       Cranial Nerves: No cranial nerve deficit.      Coordination: Coordination normal.   Psychiatric:         Speech: Speech normal.         Behavior: Behavior normal. Behavior is cooperative.         Thought Content: Thought content normal.         Judgment: Judgment normal.       Assessment   1. Antiplatelet or antithrombotic long-term use    2. 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  Follow-up in 4 months to recheck.    [facility], Post-Procedure Instructions:    Protect the incisions from sunlight. Sunlight to the incisions will cause permanent pigmentation to the incision line and make the incision more noticeable. After the incision has reepithelialized (typically 2-3 weeks after the procedure), you may begin to use sunscreen with an SPF of 15 or greater    For the first week after your procedure, apply a thin coat of Vaseline to the incision 3-4 times daily.  Starting the second week, use a silicone-based wound cream to the incisions to optimize the end result. Apply topically twice daily, or as directed, to help optimize wound healing and decrease redness.  Should the area need to be cleaned, gently apply peroxide on a Q-tip to the area.  Do not clean the area more than once daily with peroxide, as it can delay wound healing.    Avoid getting water on the surgical site for 3 days.  On day 4, you may briefly get the surgical site with clean water, but avoid soapy water to the area for 1 week.      Due to COVID-19, we have decreased the amount of postoperative checks to help prevent added exposure to the virus.  If you have any questions or concerns following her procedure, please give us a call.  We have the ability to schedule a video visit, phone visit, or follow-up visit to address any concerns, while decreasing your exposure to the hospital.  Many of your questions and concerns may be  able to be answered over the phone.  Our direct line is [phone].    It is very important to continue routine skin checks with a dermatologist or your PCP every 6-12 months.       My findings and recommendations were discussed and questions were answered.     Nomi Cardenas MD  01/12/23  10:18 CST

## 2023-01-16 NOTE — PROGRESS NOTES
AUDIOMETRIC EVALUATION      Name:  Phoebe Padilla  :  1970  Age:  52 y.o.  Date of Evaluation:  2023       History:  Reason for visit:  Ms. Padilla is seen today at the request of JAMES Deleon for a hearing evaluation. Patient reports she started having trouble hearing out of her left ear a few months ago. Patient had her hearing test about 8 years ago, but was never told the results.      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, factory work with hearing protection, concerts, guns (right-handed) now wears hearing protection, did not always  Aural Fullness:  no, both ears  Otalgia: no, both ears  Family history of hearing loss: no  Other significant history: TIA about 3 years  Head trauma requiring hospital stay: no  Previous brain MRI: maybe, 3 years ago      EVALUATION:        RESULTS:    Otoscopic Evaluation:  Right: partially occluding cerumen, tympanic membrane visualized  Left: clear canal, tympanic membrane visualized     Tympanometry (226 Hz):  Bilateral: Type A- normal, some negative pressure present    Pure Tone Audiometry (via inserts with good reliability):    Right: normal through 6kHz, sloping to mild high frequency hearing loss at 8kHz  Left: hearing sensitivity is within normal limits     Speech Audiometry:   Bilateral: Speech Reception Threshold (SRT) was obtained at 20 dBHL  Word Recognition scores- excellent/within normal limits (90 - 100%) using NU-6 List 4A, 25 words      IMPRESSIONS:  Tympanometry showed normal middle ear pressure and static compliance, for both ears. Pure tone thresholds for the right ear show a mild high frequency hearing loss at 8kHz. Loss may be sensorineural or might be due to partially occluding cerumen. Pure tone thresholds for the left ear show hearing sensitivity is within normal limits, suggesting normal  outer/middle ear function and normal cochlear/retrocochlear function. Patient was counseled with regard to the findings.      Diagnosis:  1. High frequency hearing loss, right    2. Tinnitus of both ears    3. Dizziness         RECOMMENDATIONS/PLAN:  Follow-up recommendations per JAMES Deleon    Audiologic follow-up in 1 year  Return for audiologic testing if noticing changes in hearing or concerns arise  Use communication strategies  Use hearing protection around loud noises  Avoid silence when possible. Sleep with white noise/fan, or listen to nature sounds     EDUCATION:  Discussed results and recommendations with patient. Questions were addressed and the patient was encouraged to contact our department should concerns arise.        Jennifer Miranda [facility]  Licensed Audiologist

## 2023-01-17 ENCOUNTER — PROCEDURE VISIT (OUTPATIENT)
Dept: OTOLARYNGOLOGY | Facility: CLINIC | Age: 53
End: 2023-01-17
Payer: COMMERCIAL

## 2023-01-17 ENCOUNTER — OFFICE VISIT (OUTPATIENT)
Dept: OTOLARYNGOLOGY | Facility: CLINIC | Age: 53
End: 2023-01-17
Payer: COMMERCIAL

## 2023-01-17 VITALS
WEIGHT: 227 LBS | RESPIRATION RATE: 20 BRPM | SYSTOLIC BLOOD PRESSURE: 136 MMHG | HEART RATE: 80 BPM | TEMPERATURE: 98 F | HEIGHT: 68 IN | DIASTOLIC BLOOD PRESSURE: 75 MMHG | BODY MASS INDEX: 34.4 KG/M2

## 2023-01-17 DIAGNOSIS — L57.0 ACTINIC KERATOSIS: ICD-10-CM

## 2023-01-17 DIAGNOSIS — H93.13 TINNITUS OF BOTH EARS: ICD-10-CM

## 2023-01-17 DIAGNOSIS — J30.9 ALLERGIC RHINITIS, UNSPECIFIED SEASONALITY, UNSPECIFIED TRIGGER: ICD-10-CM

## 2023-01-17 DIAGNOSIS — D04.39 SQUAMOUS CELL CARCINOMA IN SITU (SCCIS) OF SKIN OF NOSE: ICD-10-CM

## 2023-01-17 DIAGNOSIS — R42 DIZZINESS: ICD-10-CM

## 2023-01-17 DIAGNOSIS — H69.83 DYSFUNCTION OF BOTH EUSTACHIAN TUBES: Primary | ICD-10-CM

## 2023-01-17 DIAGNOSIS — H91.91 HIGH FREQUENCY HEARING LOSS, RIGHT: Primary | ICD-10-CM

## 2023-01-17 PROCEDURE — 92567 TYMPANOMETRY: CPT

## 2023-01-17 PROCEDURE — 99213 OFFICE O/P EST LOW 20 MIN: CPT | Performed by: NURSE PRACTITIONER

## 2023-01-17 PROCEDURE — 92557 COMPREHENSIVE HEARING TEST: CPT

## 2023-01-17 RX ORDER — FLUTICASONE PROPIONATE 50 MCG
2 SPRAY, SUSPENSION (ML) NASAL DAILY
Qty: 16 G | Refills: 11 | Status: SHIPPED | OUTPATIENT
Start: 2023-01-17 | End: 2023-02-16

## 2023-01-26 ENCOUNTER — ANESTHESIA EVENT (OUTPATIENT)
Dept: GASTROENTEROLOGY | Facility: HOSPITAL | Age: 53
End: 2023-01-26
Payer: COMMERCIAL

## 2023-01-26 ENCOUNTER — TELEPHONE (OUTPATIENT)
Dept: GASTROENTEROLOGY | Facility: CLINIC | Age: 53
End: 2023-01-26
Payer: COMMERCIAL

## 2023-01-26 ENCOUNTER — HOSPITAL ENCOUNTER (OUTPATIENT)
Facility: HOSPITAL | Age: 53
Setting detail: HOSPITAL OUTPATIENT SURGERY
Discharge: HOME | End: 2023-01-26
Attending: INTERNAL MEDICINE | Admitting: INTERNAL MEDICINE
Payer: COMMERCIAL

## 2023-01-26 ENCOUNTER — ANESTHESIA (OUTPATIENT)
Dept: GASTROENTEROLOGY | Facility: HOSPITAL | Age: 53
End: 2023-01-26
Payer: COMMERCIAL

## 2023-01-26 VITALS
HEART RATE: 80 BPM | HEIGHT: 68 IN | SYSTOLIC BLOOD PRESSURE: 117 MMHG | WEIGHT: 227 LBS | RESPIRATION RATE: 18 BRPM | BODY MASS INDEX: 34.4 KG/M2 | DIASTOLIC BLOOD PRESSURE: 73 MMHG | TEMPERATURE: 97.1 F | OXYGEN SATURATION: 95 %

## 2023-01-26 DIAGNOSIS — Z83.79 FAMILY HISTORY OF CHRONIC ULCERATIVE COLITIS: ICD-10-CM

## 2023-01-26 DIAGNOSIS — Z80.0 FH: COLON CANCER: ICD-10-CM

## 2023-01-26 DIAGNOSIS — Z83.71 FH: COLON POLYPS: ICD-10-CM

## 2023-01-26 PROCEDURE — 25010000002 PROPOFOL 10 MG/ML EMULSION: Performed by: NURSE ANESTHETIST, CERTIFIED REGISTERED

## 2023-01-26 PROCEDURE — 45378 DIAGNOSTIC COLONOSCOPY: CPT | Performed by: INTERNAL MEDICINE

## 2023-01-26 RX ORDER — PROPOFOL 10 MG/ML
VIAL (ML) INTRAVENOUS AS NEEDED
Status: DISCONTINUED | OUTPATIENT
Start: 2023-01-26 | End: 2023-01-26 | Stop reason: SURG

## 2023-01-26 RX ORDER — SODIUM CHLORIDE 0.9 % (FLUSH) 0.9 %
10 SYRINGE (ML) INJECTION AS NEEDED
Status: DISCONTINUED | OUTPATIENT
Start: 2023-01-26 | End: 2023-01-26 | Stop reason: HOSPADM

## 2023-01-26 RX ORDER — SODIUM CHLORIDE 9 MG/ML
500 INJECTION, SOLUTION INTRAVENOUS CONTINUOUS PRN
Status: DISCONTINUED | OUTPATIENT
Start: 2023-01-26 | End: 2023-01-26 | Stop reason: HOSPADM

## 2023-01-26 RX ORDER — LIDOCAINE HYDROCHLORIDE 20 MG/ML
INJECTION, SOLUTION EPIDURAL; INFILTRATION; INTRACAUDAL; PERINEURAL AS NEEDED
Status: DISCONTINUED | OUTPATIENT
Start: 2023-01-26 | End: 2023-01-26 | Stop reason: SURG

## 2023-01-26 RX ADMIN — PROPOFOL INJECTABLE EMULSION 500 MG: 10 INJECTION, EMULSION INTRAVENOUS at 07:37

## 2023-01-26 RX ADMIN — SODIUM CHLORIDE 500 ML: 9 INJECTION, SOLUTION INTRAVENOUS at 07:23

## 2023-01-26 RX ADMIN — LIDOCAINE HYDROCHLORIDE 50 MG: 20 INJECTION, SOLUTION EPIDURAL; INFILTRATION; INTRACAUDAL; PERINEURAL at 07:37

## 2023-01-26 NOTE — ANESTHESIA PREPROCEDURE EVALUATION
Anesthesia Evaluation     Patient summary reviewed   no history of anesthetic complications:  NPO Solid Status: > 8 hours             Airway   Mallampati: II  Dental          Pulmonary    (+) a smoker, COPD,   (-) asthma, sleep apnea  Cardiovascular   Exercise tolerance: excellent (>7 METS)    (+) hypertension, hyperlipidemia,   (-) pacemaker, past MI, angina, cardiac stents      Neuro/Psych  (+) TIA,    (-) seizures, CVA  GI/Hepatic/Renal/Endo    (+) obesity,  GERD,    (-) liver disease, no renal disease, diabetes    Musculoskeletal     Abdominal    Substance History      OB/GYN          Other                          Anesthesia Plan    ASA 3     MAC     intravenous induction     Anesthetic plan, risks, benefits, and alternatives have been provided, discussed and informed consent has been obtained with: patient.        CODE STATUS:

## 2023-01-26 NOTE — ANESTHESIA POSTPROCEDURE EVALUATION
"Patient: Phoebe Padilla    Procedure Summary     Date: 01/26/23 Room / Location: [facility] ENDOSCOPY 5 / [facility]    Anesthesia Start: 0734 Anesthesia Stop: 0754    Procedure: COLONOSCOPY WITH ANESTHESIA Diagnosis:       Family history of chronic ulcerative colitis      FH: colon cancer      FH: colon polyps      (Family history of chronic ulcerative colitis [Z83.79])      (FH: colon cancer [Z80.0])      (FH: colon polyps [Z83.71])    Surgeons: Sara Fry MD Provider: Elgin Aparicio CRNA    Anesthesia Type: MAC ASA Status: 3          Anesthesia Type: MAC    Vitals  Vitals Value Taken Time   BP     Temp     Pulse 78 01/26/23 0755   Resp     SpO2 95 % 01/26/23 0755   Vitals shown include unvalidated device data.        Post Anesthesia Care and Evaluation    Patient location during evaluation: PHASE II  Patient participation: complete - patient participated  Level of consciousness: awake and alert  Pain score: 0  Pain management: adequate    Airway patency: patent  Anesthetic complications: No anesthetic complications  PONV Status: none  Cardiovascular status: acceptable  Respiratory status: acceptable  Hydration status: acceptable    Comments: Blood pressure 126/73, pulse 90, temperature 97.1 °F (36.2 °C), temperature source Temporal, resp. rate 20, height 172.7 cm (68\"), weight 103 kg (227 lb), last menstrual period 12/07/2017, SpO2 99 %.    Pt discharged from PACU based on hien score >8      "

## 2023-03-01 ENCOUNTER — OFFICE VISIT (OUTPATIENT)
Dept: OTOLARYNGOLOGY | Facility: CLINIC | Age: 53
End: 2023-03-01
Payer: COMMERCIAL

## 2023-03-01 VITALS — BODY MASS INDEX: 34.97 KG/M2 | TEMPERATURE: 98 F | OXYGEN SATURATION: 95 % | HEART RATE: 82 BPM | WEIGHT: 230 LBS

## 2023-03-01 DIAGNOSIS — J30.9 ALLERGIC RHINITIS, UNSPECIFIED SEASONALITY, UNSPECIFIED TRIGGER: ICD-10-CM

## 2023-03-01 DIAGNOSIS — D04.39 SQUAMOUS CELL CARCINOMA IN SITU (SCCIS) OF SKIN OF NOSE: ICD-10-CM

## 2023-03-01 DIAGNOSIS — H65.492 CHRONIC OTITIS MEDIA OF LEFT EAR WITH EFFUSION: ICD-10-CM

## 2023-03-01 DIAGNOSIS — H93.13 TINNITUS OF BOTH EARS: ICD-10-CM

## 2023-03-01 DIAGNOSIS — T81.89XA RETAINED SUTURE, INITIAL ENCOUNTER: ICD-10-CM

## 2023-03-01 DIAGNOSIS — L57.0 ACTINIC KERATOSIS: ICD-10-CM

## 2023-03-01 DIAGNOSIS — Z18.9 RETAINED SUTURE, INITIAL ENCOUNTER: ICD-10-CM

## 2023-03-01 DIAGNOSIS — H69.82 DYSFUNCTION OF LEFT EUSTACHIAN TUBE: Primary | ICD-10-CM

## 2023-03-01 DIAGNOSIS — Z72.0 TOBACCO ABUSE: ICD-10-CM

## 2023-03-01 PROCEDURE — 99024 POSTOP FOLLOW-UP VISIT: CPT | Performed by: OTOLARYNGOLOGY

## 2023-03-01 PROCEDURE — 31231 NASAL ENDOSCOPY DX: CPT | Performed by: OTOLARYNGOLOGY

## 2023-03-01 PROCEDURE — 99214 OFFICE O/P EST MOD 30 MIN: CPT | Performed by: NURSE PRACTITIONER

## 2023-03-01 PROCEDURE — 69433 CREATE EARDRUM OPENING: CPT | Performed by: OTOLARYNGOLOGY

## 2023-03-01 RX ORDER — CIPROFLOXACIN AND DEXAMETHASONE 3; 1 MG/ML; MG/ML
4 SUSPENSION/ DROPS AURICULAR (OTIC) 2 TIMES DAILY
Qty: 7.5 ML | Refills: 0 | Status: SHIPPED | OUTPATIENT
Start: 2023-03-01 | End: 2023-03-06

## 2023-03-01 NOTE — PROGRESS NOTES
Preprocedure diagnosis: 1.  Unilateral chronic otitis media with effusion  2.  Tobacco abuse history    Post procedure diagnosis:  1.  Unilateral chronic otitis media with effusion  2.  Tobacco abuse history  3.  No evidence of nasopharyngeal carcinoma    Procedure performed: Nasal endoscopy     Surgeon: Nomi Cardenas M.D.    Anesthesia: Topical Pontocaine and phenylephrine    Details: After patient verification and consent was obtained, the bilateral nasal cavities were topically anesthetized with Pontocaine and phenylephrine.  The zero degree rigid nasal endoscope was passed into the left nostril.  The following is a summary of findings:    Nasal mucosa: Allergic-appearing    Nasal septum: Slightly deflected    Inferior turbinate: Hypertrophic    Middle turbinate: Healthy    Nasopharynx: Healthy without mass in the nasopharynx.  Both Rosenmuller fossae are healthy without evidence of mass.    The scope was withdrawn.  The patient tolerated the procedure without any complications.

## 2023-03-01 NOTE — PROGRESS NOTES
PATIENT NAME:  Phoebe Padilla    DATE: 03/01/23    PREOPERATIVE DIAGNOSIS: Left-sided chronic otitis media with effusion    POSTOPERATIVE DIAGNOSIS: Left-sided chronic otitis media with effusion    PROCEDURE: Left myringotomy and pressure equalization tube placement    SURGEON:  Nomi Cardenas MD, FACS    FACILITY: [facility] Office Procedure Room    ANESTHESIA: Topical phenol    DICTATED BY:  Nomi Cardenas MD, FACS    IVF: None    IMPLANTS: None    DRAINS: None    SPECIMENS: None    EBL: Scant    COMPLICATIONS: None apparent    INDICATIONS FOR SURGERY: Ms. Cobos presented with left ear fullness and muffled hearing.  She was noted to have an effusion.  She opted for an ear tube, as she failed medical management with nasal steroids.    OPERATIVE FINDINGS: There is a clear effusion on the left    OPERATIVE DETAILS: After patient verification consent was obtained, the left ear was visualized under the binocular microscope.   A mild amount of cerumen was removed utilizing a cerumen curette.   The topical phenol was placed to the anterior inferior quadrant.  After approximately 3 minutes, a myringotomy was placed utilizing a myringotomy knife in a radial fashion at the anterior inferior quadrant.  Clear fluid was suctioned with a 3 Faulkner suction.  A pressure equalization tube was then placed utilizing the alligator forceps, and position utilizing a Dai needle.         DISPOSITION:  The procedures were completed without complication and tolerated well.  The patient was released in the company of herself to return home in satisfactory condition.  A follow-up appointment has been scheduled, routine post-op medications prescribed (if required), and post-op instructions were given to the responsible party.           Nomi Cardenas MD, FACS  Board Certified Facial Plastic and Reconstructive Surgery  Board Certified Otolaryngology -- Head and Neck     Electronically signed by Nomi Cardenas MD,  03/01/23, 4:46 PM CST.

## 2023-03-01 NOTE — PROGRESS NOTES
JAMES Pierre  [facility] ENT [facility] EAR NOSE & THROAT  [address], [address]  [place] [address]  Fax [phone]  Phone [phone]      Visit Type: FOLLOW UP   Chief Complaint   Patient presents with   • Follow-up     Left ear with muffled hearing        HPI  Phoebe Padilla is a 52 y.o. female who presents for follow-up.  She was evaluated on 1/12/23 by Dr. Cardenas regarding a skin lesion of the left nasal tip.  Dr. Cardenas treated this skin lesion of the left nasal tip with cryotherapy as an actinic keratosis.  It is well healed and has resolved.  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  She reports she is breathing very well through her nose.    She is also here to follow-up from complaints of muffled hearing and ear pressure of the left ear.  This began a couple of months ago after she had COVID.  She also has complaints of bilateral high-pitched tinnitus that has been constant for years.  The intensity varies.  She has been using Flonase since her last visit on 1/17/23 without improvement in symptoms. She had minimal, intermittent improvement 3 days ago, but her symptoms quickly returned. She feels the Flonase has worsened her nasal congestion and nasal drainage causing copious amounts of phlegm. She denies otalgia.     Past Medical History:   Diagnosis Date   • Abnormal bleeding in menstrual cycle    • Arthritis    • COPD (chronic obstructive pulmonary disease) (HCC)    • COVID     x2   • Family history of colon cancer    • Family history of colonic polyps    • Fibromyalgia    • IBS (irritable bowel syndrome)    • MRSA infection     12 yrs ago, on back   • Neck pain    • Pelvic pain    • TIA (transient ischemic attack)        Past Surgical History:   Procedure Laterality Date   • COLONOSCOPY  12/12/2016    Dr. Yovani Contreras-The mucosa  appeared normal throughout the entire examined ileum and colon; Random colon biopsies obtained throughout the colon for evaluation for microscopic colitis; Repeat 10 years   • COLONOSCOPY N/A 1/26/2023    Procedure: COLONOSCOPY WITH ANESTHESIA;  Surgeon: Sara Fry MD;  Location: [facility] ENDOSCOPY;  Service: Gastroenterology;  Laterality: N/A;  pre ulcerative colitis  post normal  Dr. Wasserman   • ENDOSCOPY  12/12/2016    Dr. Yovani Contreras-Normal upper GI tract endoscopically; S/p gastric and duodenal biopsies   • LAPAROSCOPIC TUBAL LIGATION     • SALPINGECTOMY Right    • SKIN LESION EXCISION Right 01/03/2023    Procedure: Excision of skin lesion of right nasal tip with East-West flap reconstruction.  Permanent section analysis.;  Surgeon: Nomi Cardenas MD;  Location: [facility] OR;  Service: ENT;  Laterality: Right;   • TONSILLECTOMY AND ADENOIDECTOMY     • TOTAL LAPAROSCOPIC HYSTERECTOMY SALPINGO OOPHORECTOMY Bilateral 12/14/2017    Procedure: TOTAL LAPAROSCOPIC HYSTERECTOMY BILATERAL SALPINGOOPHERECTOMY WITH DAVINCI SI ROBOT, CYSTO;  Surgeon: Korina Waters MD;  Location: [facility] OR;  Service:    • TUBAL ABDOMINAL LIGATION  1991       Family History: Her family history includes Alcohol abuse in her father; Colon cancer in her maternal grandfather; Colon polyps in her mother and son; Esophageal cancer in her father; Inflammatory bowel disease in her mother; Irritable bowel syndrome in her mother; Lung cancer in her father; Stomach cancer in her maternal grandmother; Ulcerative colitis in her mother.     Social History: She  reports that she has been smoking cigarettes. She has a 37.00 pack-year smoking history. She has never used smokeless tobacco. She reports current alcohol use. She reports that she does not use drugs.    Home Medications:  DULoxetine, QUEtiapine, clopidogrel, dicyclomine, famotidine, fluticasone, metoprolol tartrate, and pravastatin    Allergies:  She is allergic to losartan potassium and  latex.       Vital Signs:   Temp:  [98 °F (36.7 °C)] 98 °F (36.7 °C)  Heart Rate:  [82] 82 Pulse 82   Temp 98 °F (36.7 °C) (Temporal)   Wt 104 kg (230 lb)   LMP 2017   SpO2 95%   BMI 34.97 kg/m²     Physical Exam  Vitals and nursing note reviewed.   Constitutional:       General: She is not in acute distress.     Appearance: She is well-developed. She is not diaphoretic.   HENT:      Head: Normocephalic and atraumatic.      Right Ear: Tympanic membrane and external ear normal.      Left Ear: Ear canal and external ear normal. A middle ear effusion is present.      Ears:      Comments: Mild amount of cerumen present in the right EAC.     Nose: Mucosal edema and congestion present.     Eyes:      General: No scleral icterus.        Right eye: No discharge.         Left eye: No discharge.      Conjunctiva/sclera: Conjunctivae normal.      Pupils: Pupils are equal, round, and reactive to light.   Neck:      Thyroid: No thyromegaly.      Vascular: No JVD.      Trachea: No tracheal deviation.   Pulmonary:      Effort: Pulmonary effort is normal.      Breath sounds: No stridor.   Musculoskeletal:         General: No deformity. Normal range of motion.      Cervical back: Normal range of motion and neck supple.   Lymphadenopathy:      Cervical: No cervical adenopathy.   Skin:     General: Skin is warm and dry.      Coloration: Skin is not pale.      Findings: No erythema or rash.   Neurological:      Mental Status: She is alert and oriented to person, place, and time.      Cranial Nerves: No cranial nerve deficit.      Coordination: Coordination normal.   Psychiatric:         Speech: Speech normal.         Behavior: Behavior normal. Behavior is cooperative.         Thought Content: Thought content normal.         Judgment: Judgment normal.         Result Review    RESULTS REVIEW    I have reviewed the patients old records in the chart.       Name:  Phoebe Padilla  :  1970  Age:  52 y.o.  Date of  Evaluation:  01/17/2023         History:  Reason for visit:  Ms. Padilla is seen today at the request of JAMES Deleon for a hearing evaluation. Patient reports she started having trouble hearing out of her left ear a few months ago. Patient had her hearing test about 8 years ago, but was never told the results.       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, factory work with hearing protection, concerts, guns (right-handed) now wears hearing protection, did not always  Aural Fullness:  no, both ears  Otalgia: no, both ears  Family history of hearing loss: no  Other significant history: TIA about 3 years  Head trauma requiring hospital stay: no  Previous brain MRI: maybe, 3 years ago        EVALUATION:          RESULTS:     Otoscopic Evaluation:  Right: partially occluding cerumen, tympanic membrane visualized  Left: clear canal, tympanic membrane visualized      Tympanometry (226 Hz):  Bilateral: Type A- normal, some negative pressure present     Pure Tone Audiometry (via inserts with good reliability):    Right: normal through 6kHz, sloping to mild high frequency hearing loss at 8kHz  Left: hearing sensitivity is within normal limits      Speech Audiometry:   Bilateral: Speech Reception Threshold (SRT) was obtained at 20 dBHL  Word Recognition scores- excellent/within normal limits (90 - 100%) using NU-6 List 4A, 25 words        IMPRESSIONS:  Tympanometry showed normal middle ear pressure and static compliance, for both ears. Pure tone thresholds for the right ear show a mild high frequency hearing loss at 8kHz. Loss may be sensorineural or might be due to partially occluding cerumen. Pure tone thresholds for the left ear show hearing sensitivity is within normal limits, suggesting normal outer/middle ear function and normal cochlear/retrocochlear function. Patient was  counseled with regard to the findings.        Diagnosis:  1. High frequency hearing loss, right    2. Tinnitus of both ears    3. Dizziness          RECOMMENDATIONS/PLAN:  Follow-up recommendations per JAMES Deleon    Audiologic follow-up in 1 year  Return for audiologic testing if noticing changes in hearing or concerns arise  Use communication strategies  Use hearing protection around loud noises  Avoid silence when possible. Sleep with white noise/fan, or listen to nature sounds      EDUCATION:  Discussed results and recommendations with patient. Questions were addressed and the patient was encouraged to contact our department should concerns arise.           Jennifer Miranda [facility]  Licensed Audiologist              Assessment & Plan    Diagnoses and all orders for this visit:    1. Dysfunction of left eustachian tube (Primary)    2. Tinnitus of both ears    3. Allergic rhinitis, unspecified seasonality, unspecified trigger    4. 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 Retained suture, initial encounter    6. Actinic keratosis    7. Tobacco abuse       AK on the left nasal tip has resolved.     Retained suture was removed from right nasal dorsum.     Protect the incisions from sunlight. Sunlight to the incisions will cause permanent pigmentation to the incision line and make the incision more noticeable. After the incision has reepithelialized (typically 2-3 weeks after the procedure), you may begin to use sunscreen with an SPF of 15 or greater    May use a OTC silicone-based wound cream to the incisions to optimize the end result. Apply topically twice daily, or as directed, to help optimize wound healing and decrease redness.    Patient has continued left ear complaints despite intranasal steroid use. Medical vs surgical options were discussed. The patient would like to proceed with left myringotomy tube insertion. Due to unilateral eustachian tube dysfunction and history of  tobacco abuse, I have also recommended direct visualization via nasal endoscopy.     MYRINGOTOMY TUBE INSERTION: The risks and benefits of myringotomy tube insertion were explained including but not limited to pain, aural fullness, bleeding, infection, risks of the anesthesia, persistent tympanic membrane perforation, chronic otorrhea, early and late extrusion, and the possibility for the need of reinsertion after extrusion. Alternatives were discussed. The patient/parents demonstrated understanding of these risks. Questions were asked appropriately answered.      Please consider quitting tobacco use. Tobacco use can aggravate most ENT conditions we treat and can complicate or limit the effects of treatment of these conditions. Surgical outcomes are also affected by tobacco use including a higher risk of surgical complications. There are may options to assist the process of tobacco cessation including medicines, therapies and counseling. If you are interested in quitting, our office will help direct you to the best options to acheive this goal.    She was instructed to call return to any problems arise prior to next office visit.    No follow-ups on file.      JAMES Pierre

## 2023-04-12 ENCOUNTER — OFFICE VISIT (OUTPATIENT)
Dept: OTOLARYNGOLOGY | Facility: CLINIC | Age: 53
End: 2023-04-12
Payer: COMMERCIAL

## 2023-04-12 VITALS — DIASTOLIC BLOOD PRESSURE: 80 MMHG | HEART RATE: 90 BPM | TEMPERATURE: 97.8 F | SYSTOLIC BLOOD PRESSURE: 130 MMHG

## 2023-04-12 DIAGNOSIS — J30.9 ALLERGIC RHINITIS, UNSPECIFIED SEASONALITY, UNSPECIFIED TRIGGER: ICD-10-CM

## 2023-04-12 DIAGNOSIS — Z96.22 S/P MYRINGOTOMY WITH INSERTION OF TUBE: ICD-10-CM

## 2023-04-12 DIAGNOSIS — H65.492 CHRONIC OTITIS MEDIA OF LEFT EAR WITH EFFUSION: ICD-10-CM

## 2023-04-12 DIAGNOSIS — H93.13 TINNITUS OF BOTH EARS: ICD-10-CM

## 2023-04-12 DIAGNOSIS — L57.0 ACTINIC KERATOSIS: ICD-10-CM

## 2023-04-12 DIAGNOSIS — D04.39 SQUAMOUS CELL CARCINOMA IN SITU (SCCIS) OF SKIN OF NOSE: ICD-10-CM

## 2023-04-12 DIAGNOSIS — H69.82 DYSFUNCTION OF LEFT EUSTACHIAN TUBE: Primary | ICD-10-CM

## 2023-04-12 DIAGNOSIS — Z72.0 TOBACCO ABUSE: ICD-10-CM

## 2023-04-12 PROCEDURE — 99213 OFFICE O/P EST LOW 20 MIN: CPT | Performed by: NURSE PRACTITIONER

## 2023-04-12 NOTE — PROGRESS NOTES
JAMES Pierre  W ENT [facility] EAR NOSE & THROAT  [address], [address]  [place] [address]  Fax [phone]  Phone [phone]      Visit Type: FOLLOW UP   Chief Complaint   Patient presents with   • Otitis Media     Pt here for follow up and doing well        Phoebe Padilla is a 52 y.o. female who presents for follow-up.  She was evaluated on 1/12/23 by Dr. Cardenas regarding a skin lesion of the left nasal tip.  Dr. Cardenas treated this skin lesion of the left nasal tip with cryotherapy as an actinic keratosis.  It is well healed and has resolved.  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  She reports she is breathing very well through her nose.  She denies any new or worrisome skin lesion    She is also status post left myringotomy tube insertion on 3/1/2023.  She has been doing very well.  She reports the ear pressure and muffled hearing of the left ear has resolved following myringotomy tube insertion.  She denies otalgia or otorrhea.    Past Medical History:   Diagnosis Date   • Abnormal bleeding in menstrual cycle    • Arthritis    • COPD (chronic obstructive pulmonary disease)    • COVID     x2   • Family history of colon cancer    • Family history of colonic polyps    • Fibromyalgia    • IBS (irritable bowel syndrome)    • MRSA infection     12 yrs ago, on back   • Neck pain    • Pelvic pain    • TIA (transient ischemic attack)        Past Surgical History:   Procedure Laterality Date   • COLONOSCOPY  12/12/2016    Dr. Yovani Contreras-The mucosa appeared normal throughout the entire examined ileum and colon; Random colon biopsies obtained throughout the colon for evaluation for microscopic colitis; Repeat 10 years   • COLONOSCOPY N/A 1/26/2023    Procedure: COLONOSCOPY WITH ANESTHESIA;  Surgeon: Sara Fry MD;  Location: [facility]  ENDOSCOPY;  Service: Gastroenterology;  Laterality: N/A;  pre ulcerative colitis  post normal  Dr. Wasserman   • ENDOSCOPY  12/12/2016    Dr. Yovani Contreras-Normal upper GI tract endoscopically; S/p gastric and duodenal biopsies   • LAPAROSCOPIC TUBAL LIGATION     • SALPINGECTOMY Right    • SKIN LESION EXCISION Right 01/03/2023    Procedure: Excision of skin lesion of right nasal tip with East-West flap reconstruction.  Permanent section analysis.;  Surgeon: Nomi Cardenas MD;  Location:  [facility] OR;  Service: ENT;  Laterality: Right;   • TONSILLECTOMY AND ADENOIDECTOMY     • TOTAL LAPAROSCOPIC HYSTERECTOMY SALPINGO OOPHORECTOMY Bilateral 12/14/2017    Procedure: TOTAL LAPAROSCOPIC HYSTERECTOMY BILATERAL SALPINGOOPHERECTOMY WITH DAVINCI SI ROBOT, CYSTO;  Surgeon: Korina Waters MD;  Location:  [facility] OR;  Service:    • TUBAL ABDOMINAL LIGATION  1991       Family History: Her family history includes Alcohol abuse in her father; Colon cancer in her maternal grandfather; Colon polyps in her mother and son; Esophageal cancer in her father; Inflammatory bowel disease in her mother; Irritable bowel syndrome in her mother; Lung cancer in her father; Stomach cancer in her maternal grandmother; Ulcerative colitis in her mother.     Social History: She  reports that she has been smoking cigarettes. She has a 37.00 pack-year smoking history. She has never used smokeless tobacco. She reports current alcohol use. She reports that she does not use drugs.    Home Medications:  DULoxetine, QUEtiapine, clopidogrel, dicyclomine, famotidine, fluticasone, metoprolol tartrate, and pravastatin    Allergies:  She is allergic to losartan potassium and latex.       Vital Signs:   Temp:  [97.8 °F (36.6 °C)] 97.8 °F (36.6 °C)  Heart Rate:  [90] 90  BP: (130)/(80) 130/80 /80   Pulse 90   Temp 97.8 °F (36.6 °C) (Temporal)   LMP 12/07/2017     Physical Exam  Vitals and nursing note reviewed.   Constitutional:       General: She is not in  acute distress.     Appearance: She is well-developed. She is not diaphoretic.   HENT:      Head: Normocephalic and atraumatic.      Right Ear: Tympanic membrane and external ear normal.      Left Ear: Ear canal and external ear normal. A PE tube is present.      Ears:        Comments: Mild amount of cerumen present in the right EAC.     Nose: Mucosal edema and congestion present.     Eyes:      General: No scleral icterus.        Right eye: No discharge.         Left eye: No discharge.      Conjunctiva/sclera: Conjunctivae normal.      Pupils: Pupils are equal, round, and reactive to light.   Neck:      Thyroid: No thyromegaly.      Vascular: No JVD.      Trachea: No tracheal deviation.   Pulmonary:      Effort: Pulmonary effort is normal.      Breath sounds: No stridor.   Musculoskeletal:         General: No deformity. Normal range of motion.      Cervical back: Normal range of motion and neck supple.   Lymphadenopathy:      Cervical: No cervical adenopathy.   Skin:     General: Skin is warm and dry.      Coloration: Skin is not pale.      Findings: No erythema or rash.   Neurological:      Mental Status: She is alert and oriented to person, place, and time.      Cranial Nerves: No cranial nerve deficit.      Coordination: Coordination normal.   Psychiatric:         Speech: Speech normal.         Behavior: Behavior normal. Behavior is cooperative.         Thought Content: Thought content normal.         Judgment: Judgment normal.         Result Review    RESULTS REVIEW    I have reviewed the patients old records in the chart.       Name:  Phoebe Padilla  :  1970  Age:  52 y.o.  Date of Evaluation:  2023         History:  Reason for visit:  Ms. Padilla is seen today at the request of JAMES Deleon for a hearing evaluation. Patient reports she started having trouble hearing out of her left ear a few months ago. Patient had her hearing test about 8 years ago, but was never told the results.        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, factory work with hearing protection, concerts, guns (right-handed) now wears hearing protection, did not always  Aural Fullness:  no, both ears  Otalgia: no, both ears  Family history of hearing loss: no  Other significant history: TIA about 3 years  Head trauma requiring hospital stay: no  Previous brain MRI: maybe, 3 years ago        EVALUATION:          RESULTS:     Otoscopic Evaluation:  Right: partially occluding cerumen, tympanic membrane visualized  Left: clear canal, tympanic membrane visualized      Tympanometry (226 Hz):  Bilateral: Type A- normal, some negative pressure present     Pure Tone Audiometry (via inserts with good reliability):    Right: normal through 6kHz, sloping to mild high frequency hearing loss at 8kHz  Left: hearing sensitivity is within normal limits      Speech Audiometry:   Bilateral: Speech Reception Threshold (SRT) was obtained at 20 dBHL  Word Recognition scores- excellent/within normal limits (90 - 100%) using NU-6 List 4A, 25 words        IMPRESSIONS:  Tympanometry showed normal middle ear pressure and static compliance, for both ears. Pure tone thresholds for the right ear show a mild high frequency hearing loss at 8kHz. Loss may be sensorineural or might be due to partially occluding cerumen. Pure tone thresholds for the left ear show hearing sensitivity is within normal limits, suggesting normal outer/middle ear function and normal cochlear/retrocochlear function. Patient was counseled with regard to the findings.        Diagnosis:  1. High frequency hearing loss, right    2. Tinnitus of both ears    3. Dizziness          RECOMMENDATIONS/PLAN:  Follow-up recommendations per JAMES Deleon    Audiologic follow-up in 1 year  Return for audiologic testing if noticing changes in hearing or  concerns arise  This use communication strategies  Use hearing protection around loud noises  Avoid silence when possible. Sleep with white noise/fan, or listen to nature sounds      EDUCATION:  Discussed results and recommendations with patient. Questions were addressed and the patient was encouraged to contact our department should concerns arise.           Jennifer Miranda [facility]  Licensed Audiologist              Assessment & Plan    Diagnoses and all orders for this visit:    1. Dysfunction of left eustachian tube (Primary)    2. Chronic otitis media of left ear with effusion    3. S/P myringotomy with insertion of tube    4. Tinnitus of both ears    5. Allergic rhinitis, unspecified seasonality, unspecified trigger    6. 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 Actinic keratosis    8. Tobacco abuse       Protect the incisions from sunlight. Sunlight to the incisions will cause permanent pigmentation to the incision line and make the incision more noticeable. After the incision has reepithelialized (typically 2-3 weeks after the procedure), you may begin to use sunscreen with an SPF of 15 or greater    May use a OTC silicone-based wound cream to the incisions to optimize the end result. Apply topically twice daily, or as directed, to help optimize wound healing and decrease redness.    It is very important to continue routine skin checks with a dermatologist or your PCP every 6-12 months. Her PCP is Dr. Wasserman.     Continue dry ear precautions.    She was instructed to call return to any problems arise prior to next office visit.    Return in about 6 months (around 10/12/2023), or if symptoms worsen or fail to improve, for Recheck.      Claire Stewart APRN

## 2023-05-15 ENCOUNTER — OFFICE VISIT (OUTPATIENT)
Dept: OTOLARYNGOLOGY | Facility: CLINIC | Age: 53
End: 2023-05-15
Payer: COMMERCIAL

## 2023-05-15 VITALS — DIASTOLIC BLOOD PRESSURE: 76 MMHG | HEART RATE: 100 BPM | SYSTOLIC BLOOD PRESSURE: 127 MMHG | TEMPERATURE: 97 F

## 2023-05-15 DIAGNOSIS — D04.39 SQUAMOUS CELL CARCINOMA IN SITU (SCCIS) OF SKIN OF NOSE: ICD-10-CM

## 2023-05-15 DIAGNOSIS — J30.9 ALLERGIC RHINITIS, UNSPECIFIED SEASONALITY, UNSPECIFIED TRIGGER: ICD-10-CM

## 2023-05-15 DIAGNOSIS — Z96.22 S/P MYRINGOTOMY WITH INSERTION OF TUBE: ICD-10-CM

## 2023-05-15 DIAGNOSIS — H65.492 CHRONIC OTITIS MEDIA OF LEFT EAR WITH EFFUSION: ICD-10-CM

## 2023-05-15 DIAGNOSIS — L57.0 ACTINIC KERATOSIS: ICD-10-CM

## 2023-05-15 DIAGNOSIS — Z72.0 TOBACCO ABUSE: Primary | ICD-10-CM

## 2023-05-15 DIAGNOSIS — Z79.02 ANTIPLATELET OR ANTITHROMBOTIC LONG-TERM USE: ICD-10-CM

## 2023-05-15 DIAGNOSIS — H69.82 DYSFUNCTION OF LEFT EUSTACHIAN TUBE: ICD-10-CM

## 2023-05-15 DIAGNOSIS — H69.83 DYSFUNCTION OF BOTH EUSTACHIAN TUBES: ICD-10-CM

## 2023-05-15 NOTE — LETTER
May 15, 2023       No Recipients    Patient: Phoebe Padilla   YOB: 1970   Date of Visit: 5/15/2023       Dear Dr. Gupta Recipients:    Thank you for referring Phoebe Padilla to me for evaluation. Below are the relevant portions of my assessment and plan of care.    If you have questions, please do not hesitate to call me. I look forward to following Phoebe along with you.         Sincerely,        Nomi Cardenas MD        CC:   No Recipients    Nomi Cardenas MD  05/15/23 1533  Sign when Signing Visit  PRIMARY CARE PROVIDER: Mildred Wasserman MD  REFERRING PROVIDER: No ref. provider found    Chief Complaint   Patient presents with   • Skin Lesion     Follow up on nose lesion       Subjective    History of Present Illness:  Phoebe Padilla is a  52 y.o. female who presents for skin cancer surveillance. She was evaluated on 1/12/23 regarding a skin lesion of the left nasal tip.  I treated this skin lesion of the left nasal tip with cryotherapy as an actinic keratosis.  It is well healed and has resolved.  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  She reports she is breathing very well through her nose.  She denies any new or worrisome skin lesion     She is also status post left myringotomy tube insertion on 3/1/2023.  She has been doing very well.  She reports the ear pressure and muffled hearing of the left ear has resolved following myringotomy tube insertion.  She denies otalgia or otorrhea.    Review of Systems:  Review of Systems   Constitutional: Negative for chills, fatigue, fever and unexpected weight change.   HENT: Negative for facial swelling.    Respiratory: Negative for cough, chest tightness and shortness of breath.    Cardiovascular: Negative for chest pain.   Musculoskeletal: Negative for neck pain.   Skin: Negative for color change.   Neurological: Negative for  facial asymmetry.   Hematological: Negative for adenopathy. Does not bruise/bleed easily.       Past History:  Past Medical History:   Diagnosis Date   • Abnormal bleeding in menstrual cycle    • Arthritis    • COPD (chronic obstructive pulmonary disease)    • COVID     x2   • Family history of colon cancer    • Family history of colonic polyps    • Fibromyalgia    • IBS (irritable bowel syndrome)    • MRSA infection     12 yrs ago, on back   • Neck pain    • Pelvic pain    • TIA (transient ischemic attack)      Past Surgical History:   Procedure Laterality Date   • COLONOSCOPY  12/12/2016    Dr. Yovani Contreras-The mucosa appeared normal throughout the entire examined ileum and colon; Random colon biopsies obtained throughout the colon for evaluation for microscopic colitis; Repeat 10 years   • COLONOSCOPY N/A 1/26/2023    Procedure: COLONOSCOPY WITH ANESTHESIA;  Surgeon: Sara Fry MD;  Location: [facility] ENDOSCOPY;  Service: Gastroenterology;  Laterality: N/A;  pre ulcerative colitis  post normal  Dr. Wasserman   • ENDOSCOPY  12/12/2016    Dr. Yovani Contreras-Normal upper GI tract endoscopically; S/p gastric and duodenal biopsies   • LAPAROSCOPIC TUBAL LIGATION     • SALPINGECTOMY Right    • SKIN LESION EXCISION Right 01/03/2023    Procedure: Excision of skin lesion of right nasal tip with East-West flap reconstruction.  Permanent section analysis.;  Surgeon: Nomi Cardenas MD;  Location: [facility] OR;  Service: ENT;  Laterality: Right;   • TONSILLECTOMY AND ADENOIDECTOMY     • TOTAL LAPAROSCOPIC HYSTERECTOMY SALPINGO OOPHORECTOMY Bilateral 12/14/2017    Procedure: TOTAL LAPAROSCOPIC HYSTERECTOMY BILATERAL SALPINGOOPHERECTOMY WITH DAVINCI SI ROBOT, CYSTO;  Surgeon: Korina Waters MD;  Location: [facility] OR;  Service:    • TUBAL ABDOMINAL LIGATION  1991     Family History   Problem Relation Age of Onset   • Colon polyps Mother         < 60 years of age   • Inflammatory bowel disease Mother    • Irritable bowel syndrome  Mother    • Ulcerative colitis Mother    • Esophageal cancer Father    • Alcohol abuse Father    • Lung cancer Father    • Stomach cancer Maternal Grandmother    • Colon cancer Maternal Grandfather         In his late 60's/early 70's   • Colon polyps Son         < 60 years of age   • Liver cancer Neg Hx    • Liver disease Neg Hx      Social History     Tobacco Use   • Smoking status: Every Day     Packs/day: 1.00     Years: 37.00     Pack years: 37.00     Types: Cigarettes   • Smokeless tobacco: Never   Vaping Use   • Vaping Use: Never used   Substance Use Topics   • Alcohol use: Yes     Comment: Occasionally   • Drug use: No     Allergies:  Losartan potassium and Latex    Current Outpatient Medications:   •  clopidogrel (PLAVIX) 75 MG tablet, Take 1 tablet by mouth Daily., Disp: , Rfl:   •  dicyclomine (BENTYL) 10 MG/5ML syrup, Take 1 capsule by mouth 3 (Three) Times a Day As Needed., Disp: , Rfl:   •  DULoxetine (CYMBALTA) 30 MG capsule, Take 1 capsule by mouth Daily., Disp: , Rfl:   •  famotidine (PEPCID) 40 MG/4ML solution injection, Take 1 tablet by mouth every night at bedtime., Disp: , Rfl:   •  metoprolol tartrate (LOPRESSOR) 50 MG tablet, TAKE ONE-HALF TABLET BY MOUTH TWICE A DAY, TAKE WITH FOOD, Disp: , Rfl:   •  pravastatin (PRAVACHOL) 40 MG tablet, Take 1 tablet by mouth Daily., Disp: , Rfl:   •  QUEtiapine (SEROquel) 25 MG tablet, Take 1 tablet by mouth every night at bedtime., Disp: , Rfl:   •  fluticasone (FLONASE) 50 MCG/ACT nasal spray, 2 sprays into the nostril(s) as directed by provider Daily for 30 days., Disp: 16 g, Rfl: 11      Objective      Vital Signs:  Temp:  [97 °F (36.1 °C)] 97 °F (36.1 °C)  Heart Rate:  [100] 100  BP: (127)/(76) 127/76    Physical Exam:  Physical Exam  Vitals and nursing note reviewed.   Constitutional:       General: She is not in acute distress.     Appearance: She is well-developed. She is not diaphoretic.   HENT:      Head: Normocephalic and atraumatic.      Right  Ear: External ear normal.      Left Ear: External ear normal.      Nose: Nose normal.   Eyes:      General: No scleral icterus.        Right eye: No discharge.         Left eye: No discharge.      Conjunctiva/sclera: Conjunctivae normal.      Pupils: Pupils are equal, round, and reactive to light.   Neck:      Thyroid: No thyromegaly.      Vascular: No JVD.      Trachea: No tracheal deviation.   Pulmonary:      Effort: Pulmonary effort is normal.      Breath sounds: No stridor.   Musculoskeletal:         General: No deformity. Normal range of motion.      Cervical back: Normal range of motion and neck supple.   Lymphadenopathy:      Cervical: No cervical adenopathy.   Skin:     General: Skin is warm and dry.      Coloration: Skin is not pale.      Findings: No erythema or rash.   Neurological:      Mental Status: She is alert and oriented to person, place, and time.      Cranial Nerves: No cranial nerve deficit.      Coordination: Coordination normal.   Psychiatric:         Speech: Speech normal.         Behavior: Behavior normal. Behavior is cooperative.         Thought Content: Thought content normal.         Judgment: Judgment normal.           Assessment    Assessment:  1. Tobacco abuse    2. Actinic keratosis    3. Dysfunction of left eustachian tube    4. Allergic rhinitis, unspecified seasonality, unspecified trigger    5. Squamous cell carcinoma in situ (SCCIS) of skin of nose    6. Chronic otitis media of left ear with effusion    7. S/P myringotomy with insertion of tube    8. Dysfunction of both eustachian tubes    9. Antiplatelet or antithrombotic long-term use        Plan    Plan:  No evidence of recurrent disease.  F/U 6 months for tube check.    My findings and recommendations were discussed and questions were answered.     Nomi Cardenas MD  05/15/23  15:24 CDT

## 2023-05-15 NOTE — PROGRESS NOTES
PRIMARY CARE PROVIDER: Mildred Wasserman MD  REFERRING PROVIDER: No ref. provider found    Chief Complaint   Patient presents with   • Skin Lesion     Follow up on nose lesion       Subjective   History of Present Illness:  Phoebe Padilla is a  52 y.o. female who presents for skin cancer surveillance. She was evaluated on 1/12/23 regarding a skin lesion of the left nasal tip.  I treated this skin lesion of the left nasal tip with cryotherapy as an actinic keratosis.  It is well healed and has resolved.  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  She reports she is breathing very well through her nose.  She denies any new or worrisome skin lesion     She is also status post left myringotomy tube insertion on 3/1/2023.  She has been doing very well.  She reports the ear pressure and muffled hearing of the left ear has resolved following myringotomy tube insertion.  She denies otalgia or otorrhea.    Review of Systems:  Review of Systems   Constitutional: Negative for chills, fatigue, fever and unexpected weight change.   HENT: Negative for facial swelling.    Respiratory: Negative for cough, chest tightness and shortness of breath.    Cardiovascular: Negative for chest pain.   Musculoskeletal: Negative for neck pain.   Skin: Negative for color change.   Neurological: Negative for facial asymmetry.   Hematological: Negative for adenopathy. Does not bruise/bleed easily.       Past History:  Past Medical History:   Diagnosis Date   • Abnormal bleeding in menstrual cycle    • Arthritis    • COPD (chronic obstructive pulmonary disease)    • COVID     x2   • Family history of colon cancer    • Family history of colonic polyps    • Fibromyalgia    • IBS (irritable bowel syndrome)    • MRSA infection     12 yrs ago, on back   • Neck pain    • Pelvic pain    • TIA (transient ischemic attack)      Past Surgical  History:   Procedure Laterality Date   • COLONOSCOPY  12/12/2016    Dr. Yovani Contreras-The mucosa appeared normal throughout the entire examined ileum and colon; Random colon biopsies obtained throughout the colon for evaluation for microscopic colitis; Repeat 10 years   • COLONOSCOPY N/A 1/26/2023    Procedure: COLONOSCOPY WITH ANESTHESIA;  Surgeon: Sara Fry MD;  Location: [facility] ENDOSCOPY;  Service: Gastroenterology;  Laterality: N/A;  pre ulcerative colitis  post normal  Dr. Wasserman   • ENDOSCOPY  12/12/2016    Dr. Yovani Contreras-Normal upper GI tract endoscopically; S/p gastric and duodenal biopsies   • LAPAROSCOPIC TUBAL LIGATION     • SALPINGECTOMY Right    • SKIN LESION EXCISION Right 01/03/2023    Procedure: Excision of skin lesion of right nasal tip with East-West flap reconstruction.  Permanent section analysis.;  Surgeon: Nomi Cardenas MD;  Location: [facility] OR;  Service: ENT;  Laterality: Right;   • TONSILLECTOMY AND ADENOIDECTOMY     • TOTAL LAPAROSCOPIC HYSTERECTOMY SALPINGO OOPHORECTOMY Bilateral 12/14/2017    Procedure: TOTAL LAPAROSCOPIC HYSTERECTOMY BILATERAL SALPINGOOPHERECTOMY WITH DAVINCI SI ROBOT, CYSTO;  Surgeon: Korina Waters MD;  Location: [facility] OR;  Service:    • TUBAL ABDOMINAL LIGATION  1991     Family History   Problem Relation Age of Onset   • Colon polyps Mother         < 60 years of age   • Inflammatory bowel disease Mother    • Irritable bowel syndrome Mother    • Ulcerative colitis Mother    • Esophageal cancer Father    • Alcohol abuse Father    • Lung cancer Father    • Stomach cancer Maternal Grandmother    • Colon cancer Maternal Grandfather         In his late 60's/early 70's   • Colon polyps Son         < 60 years of age   • Liver cancer Neg Hx    • Liver disease Neg Hx      Social History     Tobacco Use   • Smoking status: Every Day     Packs/day: 1.00     Years: 37.00     Pack years: 37.00     Types: Cigarettes   • Smokeless tobacco: Never   Vaping Use   • Vaping  Use: Never used   Substance Use Topics   • Alcohol use: Yes     Comment: Occasionally   • Drug use: No     Allergies:  Losartan potassium and Latex    Current Outpatient Medications:   •  clopidogrel (PLAVIX) 75 MG tablet, Take 1 tablet by mouth Daily., Disp: , Rfl:   •  dicyclomine (BENTYL) 10 MG/5ML syrup, Take 1 capsule by mouth 3 (Three) Times a Day As Needed., Disp: , Rfl:   •  DULoxetine (CYMBALTA) 30 MG capsule, Take 1 capsule by mouth Daily., Disp: , Rfl:   •  famotidine (PEPCID) 40 MG/4ML solution injection, Take 1 tablet by mouth every night at bedtime., Disp: , Rfl:   •  metoprolol tartrate (LOPRESSOR) 50 MG tablet, TAKE ONE-HALF TABLET BY MOUTH TWICE A DAY, TAKE WITH FOOD, Disp: , Rfl:   •  pravastatin (PRAVACHOL) 40 MG tablet, Take 1 tablet by mouth Daily., Disp: , Rfl:   •  QUEtiapine (SEROquel) 25 MG tablet, Take 1 tablet by mouth every night at bedtime., Disp: , Rfl:   •  fluticasone (FLONASE) 50 MCG/ACT nasal spray, 2 sprays into the nostril(s) as directed by provider Daily for 30 days., Disp: 16 g, Rfl: 11      Objective     Vital Signs:  Temp:  [97 °F (36.1 °C)] 97 °F (36.1 °C)  Heart Rate:  [100] 100  BP: (127)/(76) 127/76    Physical Exam:  Physical Exam  Vitals and nursing note reviewed.   Constitutional:       General: She is not in acute distress.     Appearance: She is well-developed. She is not diaphoretic.   HENT:      Head: Normocephalic and atraumatic.      Right Ear: External ear normal.      Left Ear: External ear normal.      Nose: Nose normal.   Eyes:      General: No scleral icterus.        Right eye: No discharge.         Left eye: No discharge.      Conjunctiva/sclera: Conjunctivae normal.      Pupils: Pupils are equal, round, and reactive to light.   Neck:      Thyroid: No thyromegaly.      Vascular: No JVD.      Trachea: No tracheal deviation.   Pulmonary:      Effort: Pulmonary effort is normal.      Breath sounds: No stridor.   Musculoskeletal:         General: No deformity.  Normal range of motion.      Cervical back: Normal range of motion and neck supple.   Lymphadenopathy:      Cervical: No cervical adenopathy.   Skin:     General: Skin is warm and dry.      Coloration: Skin is not pale.      Findings: No erythema or rash.   Neurological:      Mental Status: She is alert and oriented to person, place, and time.      Cranial Nerves: No cranial nerve deficit.      Coordination: Coordination normal.   Psychiatric:         Speech: Speech normal.         Behavior: Behavior normal. Behavior is cooperative.         Thought Content: Thought content normal.         Judgment: Judgment normal.           Assessment   Assessment:  1. Tobacco abuse    2. Actinic keratosis    3. Dysfunction of left eustachian tube    4. Allergic rhinitis, unspecified seasonality, unspecified trigger    5. Squamous cell carcinoma in situ (SCCIS) of skin of nose    6. Chronic otitis media of left ear with effusion    7. S/P myringotomy with insertion of tube    8. Dysfunction of both eustachian tubes    9. Antiplatelet or antithrombotic long-term use        Plan   Plan:  No evidence of recurrent disease.  F/U 6 months for tube check.    My findings and recommendations were discussed and questions were answered.     Nomi Cardenas MD  05/15/23  15:24 CDT

## 2023-05-26 ENCOUNTER — HOSPITAL ENCOUNTER (OUTPATIENT)
Dept: WOMENS IMAGING | Age: 53
Discharge: HOME | End: 2023-05-26
Payer: COMMERCIAL

## 2023-05-26 DIAGNOSIS — Z12.31 ENCOUNTER FOR SCREENING MAMMOGRAM FOR MALIGNANT NEOPLASM OF BREAST: ICD-10-CM

## 2023-05-26 PROCEDURE — 77063 BREAST TOMOSYNTHESIS BI: CPT

## 2023-10-17 ENCOUNTER — OFFICE VISIT (OUTPATIENT)
Age: 53
End: 2023-10-17
Payer: COMMERCIAL

## 2023-10-17 VITALS
SYSTOLIC BLOOD PRESSURE: 136 MMHG | HEIGHT: 68 IN | HEART RATE: 65 BPM | RESPIRATION RATE: 20 BRPM | TEMPERATURE: 98 F | BODY MASS INDEX: 34.56 KG/M2 | WEIGHT: 228 LBS | DIASTOLIC BLOOD PRESSURE: 74 MMHG

## 2023-10-17 DIAGNOSIS — Z72.0 TOBACCO ABUSE DISORDER: ICD-10-CM

## 2023-10-17 DIAGNOSIS — J30.9 ALLERGIC RHINITIS, UNSPECIFIED SEASONALITY, UNSPECIFIED TRIGGER: ICD-10-CM

## 2023-10-17 DIAGNOSIS — L57.0 ACTINIC KERATOSIS: Primary | ICD-10-CM

## 2023-10-17 DIAGNOSIS — D04.39 SQUAMOUS CELL CARCINOMA IN SITU OF SKIN OF NOSE: ICD-10-CM

## 2023-10-17 DIAGNOSIS — H69.92 DYSFUNCTION OF LEFT EUSTACHIAN TUBE: ICD-10-CM

## 2023-10-17 DIAGNOSIS — H65.492 CHRONIC OTITIS MEDIA OF LEFT EAR WITH EFFUSION: ICD-10-CM

## 2023-10-17 DIAGNOSIS — Z96.22 STATUS POST MYRINGOTOMY WITH INSERTION OF TUBE: ICD-10-CM

## 2023-10-17 PROCEDURE — 17110 DESTRUCTION B9 LES UP TO 14: CPT | Performed by: NURSE PRACTITIONER

## 2023-10-17 PROCEDURE — 99214 OFFICE O/P EST MOD 30 MIN: CPT | Performed by: NURSE PRACTITIONER

## 2023-10-17 NOTE — PROGRESS NOTES
JAMES Pires  W ENT [facility] EAR NOSE & THROAT  [address], [address]  [place] [address]  Fax [phone]  Phone [phone]      Visit Type: FOLLOW UP   Chief Complaint   Patient presents with    Follow-up     New lesion nasal bridge left        Phoebe Padilla is a 53 y.o. female who presents for follow-up.  She was evaluated on 1/12/23 by Dr. Cardenas regarding a skin lesion of the left nasal tip.  Dr. Cardenas treated this skin lesion of the left nasal tip with cryotherapy as an actinic keratosis.  It is well healed and has resolved.  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  She reports she is breathing very well through her nose.      Today, she complains of a new skin lesion of the left nasal bridge.  It has been present for the last 3 to 4 months.  It is tender, rough, and scaling.  It is not enlarging or bleeding.  This lesion has not been previously treated or biopsied.    She is also status post left myringotomy tube insertion on 3/1/2023.  She has been doing very well.  She reports the ear pressure and muffled hearing of the left ear has resolved following myringotomy tube insertion.  She denies otalgia or otorrhea.      Past Medical History:   Diagnosis Date    Abnormal bleeding in menstrual cycle     Arthritis     COPD (chronic obstructive pulmonary disease)     COVID     x2    Family history of colon cancer     Family history of colonic polyps     Fibromyalgia     IBS (irritable bowel syndrome)     MRSA infection     12 yrs ago, on back    Neck pain     Pelvic pain     TIA (transient ischemic attack)        Past Surgical History:   Procedure Laterality Date    COLONOSCOPY  12/12/2016    Dr. Yovani Contreras-The mucosa appeared normal throughout the entire examined ileum and colon; Random colon biopsies obtained throughout the  "colon for evaluation for microscopic colitis; Repeat 10 years    COLONOSCOPY N/A 1/26/2023    Procedure: COLONOSCOPY WITH ANESTHESIA;  Surgeon: Sara Fry MD;  Location: [facility] ENDOSCOPY;  Service: Gastroenterology;  Laterality: N/A;  pre ulcerative colitis  post normal  Dr. Wasserman    ENDOSCOPY  12/12/2016    Dr. Yovani Contreras-Normal upper GI tract endoscopically; S/p gastric and duodenal biopsies    LAPAROSCOPIC TUBAL LIGATION      SALPINGECTOMY Right     SKIN LESION EXCISION Right 01/03/2023    Procedure: Excision of skin lesion of right nasal tip with East-West flap reconstruction.  Permanent section analysis.;  Surgeon: Nomi Cardenas MD;  Location: [facility] OR;  Service: ENT;  Laterality: Right;    TONSILLECTOMY AND ADENOIDECTOMY      TOTAL LAPAROSCOPIC HYSTERECTOMY SALPINGO OOPHORECTOMY Bilateral 12/14/2017    Procedure: TOTAL LAPAROSCOPIC HYSTERECTOMY BILATERAL SALPINGOOPHERECTOMY WITH DAVINCI SI ROBOT, CYSTO;  Surgeon: Korina Waters MD;  Location: [facility] OR;  Service:     TUBAL ABDOMINAL LIGATION  1991       Family History: Her family history includes Alcohol abuse in her father; Colon cancer in her maternal grandfather; Colon polyps in her mother and son; Esophageal cancer in her father; Inflammatory bowel disease in her mother; Irritable bowel syndrome in her mother; Lung cancer in her father; Stomach cancer in her maternal grandmother; Ulcerative colitis in her mother.     Social History: She  reports that she has been smoking cigarettes. She has a 37.00 pack-year smoking history. She has never used smokeless tobacco. She reports current alcohol use. She reports that she does not use drugs.    Home Medications:  DULoxetine, QUEtiapine, clopidogrel, dicyclomine, famotidine, fluticasone, metoprolol tartrate, and pravastatin    Allergies:  She is allergic to losartan potassium and latex.       Vital Signs:     /74   Pulse 65   Temp 98 °F (36.7 °C)   Resp 20   Ht 172.7 cm (68\")   Wt 103 kg " (228 lb)   LMP 2017   BMI 34.67 kg/m²     Physical Exam  Vitals and nursing note reviewed.   Constitutional:       General: She is not in acute distress.     Appearance: She is well-developed. She is not diaphoretic.   HENT:      Head: Normocephalic and atraumatic.      Right Ear: External ear normal.      Left Ear: External ear normal.      Nose:     Eyes:      General:         Right eye: No discharge.         Left eye: No discharge.      Conjunctiva/sclera: Conjunctivae normal.      Pupils: Pupils are equal, round, and reactive to light.   Pulmonary:      Effort: Pulmonary effort is normal.      Breath sounds: No stridor.   Musculoskeletal:         General: No deformity. Normal range of motion.      Cervical back: Normal range of motion and neck supple.   Skin:     General: Skin is warm and dry.      Coloration: Skin is not pale.      Findings: No erythema or rash.   Neurological:      Mental Status: She is alert and oriented to person, place, and time.   Psychiatric:         Speech: Speech normal.         Behavior: Behavior normal. Behavior is cooperative.         Thought Content: Thought content normal.         Judgment: Judgment normal.         Result Review    RESULTS REVIEW    I have reviewed the patients old records in the chart.       Name:  Phoebe Padilla  :  1970  Age:  52 y.o.  Date of Evaluation:  2023         History:  Reason for visit:  Ms. Padilla is seen today at the request of JAMES Deleon for a hearing evaluation. Patient reports she started having trouble hearing out of her left ear a few months ago. Patient had her hearing test about 8 years ago, but was never told the results.       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, factory work with hearing protection, concerts, guns (right-handed) now wears hearing protection, did not  always  Aural Fullness:  no, both ears  Otalgia: no, both ears  Family history of hearing loss: no  Other significant history: TIA about 3 years  Head trauma requiring hospital stay: no  Previous brain MRI: maybe, 3 years ago        EVALUATION:          RESULTS:     Otoscopic Evaluation:  Right: partially occluding cerumen, tympanic membrane visualized  Left: clear canal, tympanic membrane visualized      Tympanometry (226 Hz):  Bilateral: Type A- normal, some negative pressure present     Pure Tone Audiometry (via inserts with good reliability):    Right: normal through 6kHz, sloping to mild high frequency hearing loss at 8kHz  Left: hearing sensitivity is within normal limits      Speech Audiometry:   Bilateral: Speech Reception Threshold (SRT) was obtained at 20 dBHL  Word Recognition scores- excellent/within normal limits (90 - 100%) using NU-6 List 4A, 25 words        IMPRESSIONS:  Tympanometry showed normal middle ear pressure and static compliance, for both ears. Pure tone thresholds for the right ear show a mild high frequency hearing loss at 8kHz. Loss may be sensorineural or might be due to partially occluding cerumen. Pure tone thresholds for the left ear show hearing sensitivity is within normal limits, suggesting normal outer/middle ear function and normal cochlear/retrocochlear function. Patient was counseled with regard to the findings.        Diagnosis:  1. High frequency hearing loss, right    2. Tinnitus of both ears    3. Dizziness          RECOMMENDATIONS/PLAN:  Follow-up recommendations per JAMES Deleon    Audiologic follow-up in 1 year  Return for audiologic testing if noticing changes in hearing or concerns arise  This use communication strategies  Use hearing protection around loud noises  Avoid silence when possible. Sleep with white noise/fan, or listen to nature sounds      EDUCATION:  Discussed results and recommendations with patient. Questions were addressed and the patient was  encouraged to contact our department should concerns arise.           Jennifer Miranda [facility]  Licensed Audiologist              Assessment & Plan    Diagnoses and all orders for this visit:    1. Actinic keratosis (Primary)    2. Squamous cell carcinoma in situ of skin of nose    3. Dysfunction of left eustachian tube    4. Chronic otitis media of left ear with effusion    5. Status post myringotomy with insertion of tube    6. Allergic rhinitis, unspecified seasonality, unspecified trigger    7. Tobacco abuse disorder         The actinic keratosis of the left nasal bridge was treated with cryotherapy.  See procedure note.  We will follow-up in 3 months to assess for resolution.    Protect the incisions from sunlight. Sunlight to the incisions will cause permanent pigmentation to the incision line and make the incision more noticeable. After the incision has reepithelialized (typically 2-3 weeks after the procedure), you may begin to use sunscreen with an SPF of 15 or greater    May use a OTC silicone-based wound cream to the incisions to optimize the end result. Apply topically twice daily, or as directed, to help optimize wound healing and decrease redness.    It is very important to continue routine skin checks with a dermatologist or your PCP every 6-12 months. Her PCP is Dr. Wasserman.     Continue dry ear precautions.  Continue Flonase.    She was instructed to call return to any problems arise prior to next office visit.    Return in about 3 months (around 1/17/2024), or if symptoms worsen or fail to improve, for Recheck.      JAMES Pierre

## 2023-11-06 NOTE — PROGRESS NOTES
Preoperative Diagnosis: Actinic keratosis of left nasal bridge    Postoperative Diagnosis: Same    Procedure: Destruction with Cryotherapy     Provider: JAMES Deleon    Anesthesia: None    Location: [facility] office    Complications: None    Details of Procedure:     Patient identity was verified and consent was signed. The lesion was treated with 2 pulses of 6 second duration each; allowing the skin to completely thaw between pulses. The patient tolerated the procedure without complication.    JAMES Pires

## 2024-01-16 ENCOUNTER — TELEPHONE (OUTPATIENT)
Age: 54
End: 2024-01-16

## 2024-01-16 NOTE — TELEPHONE ENCOUNTER
Provider: JAYLYN MEDRANO    Caller: REINIER COSTELLO JAIN    Relationship to Patient: SELF    Phone Number: [phone]    Reason for Call: PT CALLED AND CANCELED TODAY APPT AND WAS RED TO 1-23-24 PER PT.

## 2024-01-23 ENCOUNTER — OFFICE VISIT (OUTPATIENT)
Age: 54
End: 2024-01-23
Payer: COMMERCIAL

## 2024-01-23 VITALS — TEMPERATURE: 97 F | SYSTOLIC BLOOD PRESSURE: 118 MMHG | DIASTOLIC BLOOD PRESSURE: 81 MMHG | HEART RATE: 82 BPM

## 2024-01-23 DIAGNOSIS — Z08 ENCOUNTER FOR FOLLOW-UP SURVEILLANCE OF SKIN CANCER: ICD-10-CM

## 2024-01-23 DIAGNOSIS — Z96.22 STATUS POST MYRINGOTOMY WITH INSERTION OF TUBE: ICD-10-CM

## 2024-01-23 DIAGNOSIS — H65.492 CHRONIC OTITIS MEDIA OF LEFT EAR WITH EFFUSION: ICD-10-CM

## 2024-01-23 DIAGNOSIS — Z85.828 ENCOUNTER FOR FOLLOW-UP SURVEILLANCE OF SKIN CANCER: ICD-10-CM

## 2024-01-23 DIAGNOSIS — D48.5 NEOPLASM OF UNCERTAIN BEHAVIOR OF SKIN: Primary | ICD-10-CM

## 2024-01-23 DIAGNOSIS — Z72.0 TOBACCO ABUSE DISORDER: ICD-10-CM

## 2024-01-23 DIAGNOSIS — D04.39 SQUAMOUS CELL CARCINOMA IN SITU OF SKIN OF NOSE: ICD-10-CM

## 2024-01-23 DIAGNOSIS — L57.0 ACTINIC KERATOSIS: ICD-10-CM

## 2024-01-23 DIAGNOSIS — H69.92 DYSFUNCTION OF LEFT EUSTACHIAN TUBE: ICD-10-CM

## 2024-01-23 PROCEDURE — 88305 TISSUE EXAM BY PATHOLOGIST: CPT | Performed by: NURSE PRACTITIONER

## 2024-01-23 NOTE — PROGRESS NOTES
JAMES Pires  [facility] ENT [facility] EAR NOSE & THROAT  [address], [address]  [place] [address]  Fax [phone]  Phone [phone]      Visit Type: FOLLOW UP   Chief Complaint   Patient presents with    Post-op Follow-up     Follow up on AK.  Pt says she still feels the bump on her nose        Phoebe Padilla is a 53 y.o. female who presents for follow-up. 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  She reports she is breathing very well through her nose.      She was evaluated on 1/12/23 by Dr. Cardenas regarding a skin lesion of the left nasal tip.  Dr. Cardenas treated this skin lesion of the left nasal tip with cryotherapy as an actinic keratosis.  It is well healed and has resolved.     At her last visit on 10/17/2023, she had complaints of a new skin lesion of the left nasal bridge.  It had been present for the last 3 to 4 months.  It was tender, rough, and scaling.  It was not enlarging or bleeding.  It was treated with cryotherapy and appears to have resolved.  However, the patient is concerned about a new skin lesion on the left nasal sidewall.  It is raised.  She would like this area biopsied to rule out malignancy.    She is also status post left myringotomy tube insertion on 3/1/2023.  She has been doing very well.  She reports the ear pressure and muffled hearing of the left ear has resolved following myringotomy tube insertion.  She denies otalgia or otorrhea.      Past Medical History:   Diagnosis Date    Abnormal bleeding in menstrual cycle     Arthritis     COPD (chronic obstructive pulmonary disease)     COVID     x2    Family history of colon cancer     Family history of colonic polyps     Fibromyalgia     IBS (irritable bowel syndrome)     MRSA infection     12 yrs ago, on back    Neck pain     Pelvic pain     TIA (transient  ischemic attack)        Past Surgical History:   Procedure Laterality Date    COLONOSCOPY  12/12/2016    Dr. Yovani Contreras-The mucosa appeared normal throughout the entire examined ileum and colon; Random colon biopsies obtained throughout the colon for evaluation for microscopic colitis; Repeat 10 years    COLONOSCOPY N/A 1/26/2023    Procedure: COLONOSCOPY WITH ANESTHESIA;  Surgeon: Sara Fry MD;  Location: [facility] ENDOSCOPY;  Service: Gastroenterology;  Laterality: N/A;  pre ulcerative colitis  post normal  Dr. Wasserman    ENDOSCOPY  12/12/2016    Dr. Yovani Contreras-Normal upper GI tract endoscopically; S/p gastric and duodenal biopsies    LAPAROSCOPIC TUBAL LIGATION      SALPINGECTOMY Right     SKIN LESION EXCISION Right 01/03/2023    Procedure: Excision of skin lesion of right nasal tip with East-West flap reconstruction.  Permanent section analysis.;  Surgeon: Nomi Cardenas MD;  Location: [facility] OR;  Service: ENT;  Laterality: Right;    TONSILLECTOMY AND ADENOIDECTOMY      TOTAL LAPAROSCOPIC HYSTERECTOMY SALPINGO OOPHORECTOMY Bilateral 12/14/2017    Procedure: TOTAL LAPAROSCOPIC HYSTERECTOMY BILATERAL SALPINGOOPHERECTOMY WITH DAVINCI SI ROBOT, CYSTO;  Surgeon: Korina Waters MD;  Location: [facility] OR;  Service:     TUBAL ABDOMINAL LIGATION  1991       Family History: Her family history includes Alcohol abuse in her father; Colon cancer in her maternal grandfather; Colon polyps in her mother and son; Esophageal cancer in her father; Inflammatory bowel disease in her mother; Irritable bowel syndrome in her mother; Lung cancer in her father; Stomach cancer in her maternal grandmother; Ulcerative colitis in her mother.     Social History: She  reports that she has been smoking cigarettes. She has a 37.00 pack-year smoking history. She has never used smokeless tobacco. She reports current alcohol use. She reports that she does not use drugs.    Home Medications:  DULoxetine, clopidogrel, dicyclomine,  famotidine, metoprolol tartrate, and pravastatin    Allergies:  She is allergic to losartan potassium and latex.       Vital Signs:     /81   Pulse 82   Temp 97 °F (36.1 °C) (Temporal)   LMP 2017     Physical Exam  Vitals and nursing note reviewed.   Constitutional:       General: She is not in acute distress.     Appearance: She is well-developed. She is not diaphoretic.   HENT:      Head: Normocephalic and atraumatic.      Right Ear: External ear normal.      Left Ear: External ear normal. A PE tube (Appears to be partially extruded) is present.      Nose:     Eyes:      General:         Right eye: No discharge.         Left eye: No discharge.      Conjunctiva/sclera: Conjunctivae normal.      Pupils: Pupils are equal, round, and reactive to light.   Pulmonary:      Effort: Pulmonary effort is normal.      Breath sounds: No stridor.   Musculoskeletal:         General: No deformity. Normal range of motion.      Cervical back: Normal range of motion and neck supple.   Skin:     General: Skin is warm and dry.      Coloration: Skin is not pale.      Findings: No erythema or rash.   Neurological:      Mental Status: She is alert and oriented to person, place, and time.   Psychiatric:         Speech: Speech normal.         Behavior: Behavior normal. Behavior is cooperative.         Thought Content: Thought content normal.         Judgment: Judgment normal.         Result Review    RESULTS REVIEW    I have reviewed the patients old records in the chart.       Name:  Phoebe Padilla  :  1970  Age:  52 y.o.  Date of Evaluation:  2023         History:  Reason for visit:  Ms. Padilla is seen today at the request of JAMES Deleon for a hearing evaluation. Patient reports she started having trouble hearing out of her left ear a few months ago. Patient had her hearing test about 8 years ago, but was never told the results.       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, factory work with hearing protection, concerts, guns (right-handed) now wears hearing protection, did not always  Aural Fullness:  no, both ears  Otalgia: no, both ears  Family history of hearing loss: no  Other significant history: TIA about 3 years  Head trauma requiring hospital stay: no  Previous brain MRI: maybe, 3 years ago        EVALUATION:          RESULTS:     Otoscopic Evaluation:  Right: partially occluding cerumen, tympanic membrane visualized  Left: clear canal, tympanic membrane visualized      Tympanometry (226 Hz):  Bilateral: Type A- normal, some negative pressure present     Pure Tone Audiometry (via inserts with good reliability):    Right: normal through 6kHz, sloping to mild high frequency hearing loss at 8kHz  Left: hearing sensitivity is within normal limits      Speech Audiometry:   Bilateral: Speech Reception Threshold (SRT) was obtained at 20 dBHL  Word Recognition scores- excellent/within normal limits (90 - 100%) using NU-6 List 4A, 25 words        IMPRESSIONS:  Tympanometry showed normal middle ear pressure and static compliance, for both ears. Pure tone thresholds for the right ear show a mild high frequency hearing loss at 8kHz. Loss may be sensorineural or might be due to partially occluding cerumen. Pure tone thresholds for the left ear show hearing sensitivity is within normal limits, suggesting normal outer/middle ear function and normal cochlear/retrocochlear function. Patient was counseled with regard to the findings.        Diagnosis:  1. High frequency hearing loss, right    2. Tinnitus of both ears    3. Dizziness          RECOMMENDATIONS/PLAN:  Follow-up recommendations per JAMES Deleon    Audiologic follow-up in 1 year  Return for audiologic testing if noticing changes in hearing or concerns arise  This use communication strategies  Use hearing protection  around loud noises  Avoid silence when possible. Sleep with white noise/fan, or listen to nature sounds      EDUCATION:  Discussed results and recommendations with patient. Questions were addressed and the patient was encouraged to contact our department should concerns arise.           Jennifer Miranda [facility]  Licensed Audiologist              Assessment & Plan    Diagnoses and all orders for this visit:    1. Neoplasm of uncertain behavior of skin-probable inflamed seborrheic keratosis (Primary)  Overview:  Added automatically from request for surgery [id number]    Orders:  -     Tissue Pathology Exam; Future  -     Tissue Pathology Exam    2. Squamous cell carcinoma in situ of skin of nose    3. Actinic keratosis    4. Encounter for follow-up surveillance of skin cancer    5. Dysfunction of left eustachian tube    6. Chronic otitis media of left ear with effusion    7. Status post myringotomy with insertion of tube    8. Tobacco abuse disorder        3 mm punch biopsy was performed to the skin lesion on the left nasal sidewall- probable ISK.  See procedure note.  Post biopsy care instructions were given.  I will call her with results.    Protect the incisions from sunlight. Sunlight to the incisions will cause permanent pigmentation to the incision line and make the incision more noticeable. After the incision has reepithelialized (typically 2-3 weeks after the procedure), you may begin to use sunscreen with an SPF of 15 or greater    May use a OTC silicone-based wound cream to the incisions to optimize the end result. Apply topically twice daily, or as directed, to help optimize wound healing and decrease redness.    It is very important to continue routine skin checks with a dermatologist or your PCP every 6-12 months. Her PCP is Dr. Wasserman.     Continue dry ear precautions.      She was instructed to call return to any problems arise prior to next office visit.    Return in about 3 months (around  4/23/2024), or if symptoms worsen or fail to improve, for Recheck.      Claire Steawrt, APRN

## 2024-01-23 NOTE — PROGRESS NOTES
Preprocedure diagnosis: Inflamed seborrheic keratosis of the left nasal sidewall    Post procedure diagnosis: Same    Procedure: Punch biopsy    Details:  Patient consent was obtained.  The skin was cleansed with alcohol.  The skin was infiltrated with 1 mL of 1% lidocaine with 1-100,000 epinephrine.  After approximately 10 minutes, a 3 millimeter punch biopsy was taken by placing circular motion on the biopsy tool, the skin was elevated and clipped with iris scissors.  The specimen was sent in formalin.  The skin was closed using a simple 5-0 fast absorbing gut suture.  Antibiotic ointment was placed over the biopsy site.  The patient tolerated the procedure with minimal discomfort.    Claire Stewart, APRN  01/23/24

## 2024-01-29 ENCOUNTER — TELEPHONE (OUTPATIENT)
Age: 54
End: 2024-01-29
Payer: COMMERCIAL

## 2024-01-29 NOTE — TELEPHONE ENCOUNTER
Pt returned call regarding biopsy results and voiced understanding.     Yes thank you. The patient did let me know of this.   She has been referred up to [facility]

## 2024-01-29 NOTE — TELEPHONE ENCOUNTER
1st attempt to contact pt with path results and LM to return call when avail.  Awaiting a call back

## 2024-04-23 ENCOUNTER — OFFICE VISIT (OUTPATIENT)
Age: 54
End: 2024-04-23
Payer: COMMERCIAL

## 2024-04-23 VITALS — HEART RATE: 79 BPM | DIASTOLIC BLOOD PRESSURE: 87 MMHG | TEMPERATURE: 97.8 F | SYSTOLIC BLOOD PRESSURE: 128 MMHG

## 2024-04-23 DIAGNOSIS — L57.0 ACTINIC KERATOSIS: ICD-10-CM

## 2024-04-23 DIAGNOSIS — H65.492 CHRONIC OTITIS MEDIA OF LEFT EAR WITH EFFUSION: ICD-10-CM

## 2024-04-23 DIAGNOSIS — Z72.0 TOBACCO ABUSE DISORDER: ICD-10-CM

## 2024-04-23 DIAGNOSIS — L82.1 SEBORRHEIC KERATOSIS: ICD-10-CM

## 2024-04-23 DIAGNOSIS — H69.92 DYSFUNCTION OF LEFT EUSTACHIAN TUBE: ICD-10-CM

## 2024-04-23 DIAGNOSIS — Z96.22 STATUS POST MYRINGOTOMY WITH INSERTION OF TUBE: ICD-10-CM

## 2024-04-23 DIAGNOSIS — Z08 ENCOUNTER FOR FOLLOW-UP SURVEILLANCE OF SKIN CANCER: ICD-10-CM

## 2024-04-23 DIAGNOSIS — D04.39 SQUAMOUS CELL CARCINOMA IN SITU OF SKIN OF NOSE: Primary | ICD-10-CM

## 2024-04-23 DIAGNOSIS — Z85.828 ENCOUNTER FOR FOLLOW-UP SURVEILLANCE OF SKIN CANCER: ICD-10-CM

## 2024-04-23 PROCEDURE — 99213 OFFICE O/P EST LOW 20 MIN: CPT | Performed by: NURSE PRACTITIONER

## 2024-04-23 NOTE — PROGRESS NOTES
JAMES Pires  W ENT [facility] EAR NOSE & THROAT  [address], [address]  [place] [address]  Fax [phone]  Phone [phone]      Visit Type: FOLLOW UP   Chief Complaint   Patient presents with    Follow-up        Phoebe Padilla is a 53 y.o. female who presents for follow-up. 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  She reports she is breathing very well through her nose.  She denies any new or worrisome skin lesion.    On 1/12/23, an actinic keratosis of the left nasal tip was treated with cryotherapy.  It is well healed and has resolved.   On 10/17/23, an actinic keratosis of the left nasal bridge was treated with cryotherapy.  It is well healed and has resolved.    On 1/23/24, a skin lesion was biopsied on the left nasal sidewall.  Pathology demonstrated a seborrheic keratosis.    She is also status post left myringotomy tube insertion on 3/1/2023.  She has been doing very well.  She reports the ear pressure and muffled hearing of the left ear has resolved following myringotomy tube insertion.  She denies otalgia or otorrhea.    Derm: [facility]    Past Medical History:   Diagnosis Date    Abnormal bleeding in menstrual cycle     Arthritis     COPD (chronic obstructive pulmonary disease)     COVID     x2    Family history of colon cancer     Family history of colonic polyps     Fibromyalgia     IBS (irritable bowel syndrome)     MRSA infection     12 yrs ago, on back    Neck pain     Pelvic pain     TIA (transient ischemic attack)        Past Surgical History:   Procedure Laterality Date    COLONOSCOPY  12/12/2016    Dr. Yovani Contreras-The mucosa appeared normal throughout the entire examined ileum and colon; Random colon biopsies obtained throughout the colon for evaluation for microscopic colitis; Repeat 10 years    COLONOSCOPY N/A  1/26/2023    Procedure: COLONOSCOPY WITH ANESTHESIA;  Surgeon: Sara Fry MD;  Location: [facility] ENDOSCOPY;  Service: Gastroenterology;  Laterality: N/A;  pre ulcerative colitis  post normal  Dr. Wasserman    ENDOSCOPY  12/12/2016    Dr. Yovani Contreras-Normal upper GI tract endoscopically; S/p gastric and duodenal biopsies    LAPAROSCOPIC TUBAL LIGATION      SALPINGECTOMY Right     SKIN LESION EXCISION Right 01/03/2023    Procedure: Excision of skin lesion of right nasal tip with East-West flap reconstruction.  Permanent section analysis.;  Surgeon: Nomi Cardenas MD;  Location: [facility] OR;  Service: ENT;  Laterality: Right;    TONSILLECTOMY AND ADENOIDECTOMY      TOTAL LAPAROSCOPIC HYSTERECTOMY SALPINGO OOPHORECTOMY Bilateral 12/14/2017    Procedure: TOTAL LAPAROSCOPIC HYSTERECTOMY BILATERAL SALPINGOOPHERECTOMY WITH DAVINCI SI ROBOT, CYSTO;  Surgeon: Korina Waters MD;  Location: [facility] OR;  Service:     TUBAL ABDOMINAL LIGATION  1991       Family History: Her family history includes Alcohol abuse in her father; Colon cancer in her maternal grandfather; Colon polyps in her mother and son; Esophageal cancer in her father; Inflammatory bowel disease in her mother; Irritable bowel syndrome in her mother; Lung cancer in her father; Stomach cancer in her maternal grandmother; Ulcerative colitis in her mother.     Social History: She  reports that she has been smoking cigarettes. She has a 37 pack-year smoking history. She has never used smokeless tobacco. She reports current alcohol use. She reports that she does not use drugs.    Home Medications:  DULoxetine, clopidogrel, dicyclomine, famotidine, metoprolol tartrate, and pravastatin    Allergies:  She is allergic to losartan potassium and latex.       Vital Signs:   Temp:  [97.8 °F (36.6 °C)] 97.8 °F (36.6 °C)  Heart Rate:  [79] 79  BP: (128)/(87) 128/87 /87   Pulse 79   Temp 97.8 °F (36.6 °C) (Temporal)   LMP 12/07/2017     Physical Exam  Vitals and  nursing note reviewed.   Constitutional:       General: She is not in acute distress.     Appearance: She is well-developed. She is not diaphoretic.   HENT:      Head: Normocephalic and atraumatic.      Right Ear: External ear normal.      Left Ear: External ear normal.  No middle ear effusion. A PE tube (Myringotomy tube is extruded and resting in the ear canal.  It was removed with forceps without difficulty.) is present. Tympanic membrane is not perforated, erythematous or retracted.      Nose:     Eyes:      General:         Right eye: No discharge.         Left eye: No discharge.      Conjunctiva/sclera: Conjunctivae normal.      Pupils: Pupils are equal, round, and reactive to light.   Pulmonary:      Effort: Pulmonary effort is normal.      Breath sounds: No stridor.   Musculoskeletal:         General: No deformity. Normal range of motion.      Cervical back: Normal range of motion and neck supple.   Skin:     General: Skin is warm and dry.      Coloration: Skin is not pale.      Findings: No erythema or rash.   Neurological:      Mental Status: She is alert and oriented to person, place, and time.   Psychiatric:         Speech: Speech normal.         Behavior: Behavior normal. Behavior is cooperative.         Thought Content: Thought content normal.         Judgment: Judgment normal.         Result Review    RESULTS REVIEW    I have reviewed the patients old records in the chart.               Name:  Phoebe Padilla  :  1970  Age:  52 y.o.  Date of Evaluation:  2023         History:  Reason for visit:  Ms. Padilla is seen today at the request of JAMES Deleon for a hearing evaluation. Patient reports she started having trouble hearing out of her left ear a few months ago. Patient had her hearing test about 8 years ago, but was never told the results.       PE Tubes:  no, both ears   Other otologic surgical history: no, both ears  Tinnitus:  yes, constant ringing in both ears - not  bothersome  Dizziness:  yes  Noise Exposure: yes, factory work with hearing protection, concerts, guns (right-handed) now wears hearing protection, did not always  Aural Fullness:  no, both ears  Otalgia: no, both ears  Family history of hearing loss: no  Other significant history: TIA about 3 years  Head trauma requiring hospital stay: no  Previous brain MRI: maybe, 3 years ago        EVALUATION:          RESULTS:     Otoscopic Evaluation:  Right: partially occluding cerumen, tympanic membrane visualized  Left: clear canal, tympanic membrane visualized      Tympanometry (226 Hz):  Bilateral: Type A- normal, some negative pressure present     Pure Tone Audiometry (via inserts with good reliability):    Right: normal through 6kHz, sloping to mild high frequency hearing loss at 8kHz  Left: hearing sensitivity is within normal limits      Speech Audiometry:   Bilateral: Speech Reception Threshold (SRT) was obtained at 20 dBHL  Word Recognition scores- excellent/within normal limits (90 - 100%) using NU-6 List 4A, 25 words        IMPRESSIONS:  Tympanometry showed normal middle ear pressure and static compliance, for both ears. Pure tone thresholds for the right ear show a mild high frequency hearing loss at 8kHz. Loss may be sensorineural or might be due to partially occluding cerumen. Pure tone thresholds for the left ear show hearing sensitivity is within normal limits, suggesting normal outer/middle ear function and normal cochlear/retrocochlear function. Patient was counseled with regard to the findings.        Diagnosis:  1. High frequency hearing loss, right    2. Tinnitus of both ears    3. Dizziness          RECOMMENDATIONS/PLAN:  Follow-up recommendations per JAMES Deleon    Audiologic follow-up in 1 year  Return for audiologic testing if noticing changes in hearing or concerns arise  This use communication strategies  Use hearing protection around loud noises  Avoid silence when possible. Sleep with white  noise/fan, or listen to nature sounds      EDUCATION:  Discussed results and recommendations with patient. Questions were addressed and the patient was encouraged to contact our department should concerns arise.           Jennifer Miranda [facility]  Licensed Audiologist              Assessment & Plan    Diagnoses and all orders for this visit:    1. Squamous cell carcinoma in situ of skin of nose (Primary)    2. Actinic keratosis    3. Seborrheic keratosis    4. Encounter for follow-up surveillance of skin cancer    5. Dysfunction of left eustachian tube    6. Chronic otitis media of left ear with effusion    7. Status post myringotomy with insertion of tube    8. Tobacco abuse disorder        Protect the incisions from sunlight. Sunlight to the incisions will cause permanent pigmentation to the incision line and make the incision more noticeable. After the incision has reepithelialized (typically 2-3 weeks after the procedure), you may begin to use sunscreen with an SPF of 15 or greater    May use a OTC silicone-based wound cream to the incisions to optimize the end result. Apply topically twice daily, or as directed, to help optimize wound healing and decrease redness.    It is very important to continue routine skin checks with a dermatologist or your PCP every 6-12 months. Her PCP is Dr. Wasserman.  She also sees [facility] every 3 months.    She was instructed to call or return should any problems arise.  She would like to follow-up here as needed.    Return if symptoms worsen or fail to improve, for Recheck, Postoperatively.      JAMES Pierre

## (undated) DEVICE — DRP WARMR SCOPE PILLOW SCPE 44X66IN STRL

## (undated) DEVICE — PK ENT HD AND NK 30

## (undated) DEVICE — SUT PDS 0 CT 36IN VIO PDP358T

## (undated) DEVICE — GOWN,NON-REINFORCED,SIRUS,SET IN SLV,XL: Brand: MEDLINE

## (undated) DEVICE — HDRST INTUB GENTLETOUCH SLOT 7IN RT

## (undated) DEVICE — WIPE THERAWASH SLV SPEC CARE 2PK

## (undated) DEVICE — DUAL LUMEN STOMACH TUBE: Brand: SALEM SUMP

## (undated) DEVICE — PREP SOL POVIDONE/IODINE BT 4OZ

## (undated) DEVICE — GLV SURG BIOGEL LTX PF 8

## (undated) DEVICE — SUT MNCRYL 4/0 PS2 27IN UD MCP426H

## (undated) DEVICE — SENSR O2 OXIMAX FNGR A/ 18IN NONSTR

## (undated) DEVICE — Device: Brand: DEFENDO AIR/WATER/SUCTION AND BIOPSY VALVE

## (undated) DEVICE — SUT SILK 2/0 SH 30IN K833H

## (undated) DEVICE — TOTAL TRAY, 16FR 10ML SIL FOLEY, URN: Brand: MEDLINE

## (undated) DEVICE — CABL BIPOL MEGADYNE 12FT DISP

## (undated) DEVICE — CVR DISP HUG U VAC STEEP TREND

## (undated) DEVICE — PK TURNOVER RM ADV

## (undated) DEVICE — MASK,OXYGEN,MED CONC,ADLT,7' TUB, UC: Brand: PENDING

## (undated) DEVICE — 2, DISPOSABLE SUCTION/IRRIGATOR WITHOUT DISPOSABLE TIP: Brand: STRYKEFLOW

## (undated) DEVICE — TIP COVER ACCESSORY

## (undated) DEVICE — ENDOPATH XCEL BLUNT TIP TROCARS WITH SMOOTH SLEEVES: Brand: ENDOPATH XCEL

## (undated) DEVICE — HEWSON SUTURE RETRIEVER: Brand: HEWSON SUTURE RETRIEVER

## (undated) DEVICE — 3M™ WARMING BLANKET, UPPER BODY, 10 PER CASE, 42268: Brand: BAIR HUGGER™

## (undated) DEVICE — ANTIBACTERIAL UNDYED BRAIDED (POLYGLACTIN 910), SYNTHETIC ABSORBABLE SUTURE: Brand: COATED VICRYL

## (undated) DEVICE — CUFF,BP,DISP,1 TUBE,ADULT,HP: Brand: MEDLINE

## (undated) DEVICE — TROC ANCHORPORT BLADELES W/GRIP 12X120MM

## (undated) DEVICE — SKIN AFFIX SURG ADHESIVE 72/CS 0.55ML: Brand: MEDLINE

## (undated) DEVICE — MARKR SKIN W/RULR AND LBL

## (undated) DEVICE — GLV SURG TRIUMPH NATURAL W/ALOE PF LTX 6 STRL

## (undated) DEVICE — STRIP,CLOSURE,WOUND,MEDI-STRIP,1/2X4: Brand: MEDLINE

## (undated) DEVICE — Device

## (undated) DEVICE — OBT BLADLES ENDOWRIST DAVINCI/S 8MM

## (undated) DEVICE — YANKAUER,BULB TIP WITH VENT: Brand: ARGYLE

## (undated) DEVICE — SUT VIC 0 UR6 27IN VCP603H

## (undated) DEVICE — DEFOGGER!" ANTI FOG KIT: Brand: DEROYAL

## (undated) DEVICE — APPL HEMO SURG ARISTA/AH/FLEXITIP XL 38CM

## (undated) DEVICE — 1000 SES SMOKE EVACUATION SYSTEM, HAND HELD TUBING SET: Brand: 1000 SES

## (undated) DEVICE — PROTECTOR ULNA NERV

## (undated) DEVICE — GLV SURG TRIUMPH ORTHO W/ALOE PF LTX 7.0

## (undated) DEVICE — DAVINCI: Brand: MEDLINE INDUSTRIES, INC.

## (undated) DEVICE — THE CHANNEL CLEANING BRUSH IS A NYLON FLEXI BRUSH ATTACHED TO A FLEXIBLE PLASTIC SHEATH DESIGNED TO SAFELY REMOVE DEBRIS FROM FLEXIBLE ENDOSCOPES.

## (undated) DEVICE — DRSNG GZ CURAD XEROFORM NONADHS 5X9IN STRL

## (undated) DEVICE — BAPTIST TURNOVER KIT: Brand: MEDLINE INDUSTRIES, INC.

## (undated) DEVICE — GLV SURG TRIUMPH NATURAL W/ALOE PF LTX 7 STRL

## (undated) DEVICE — ELECTRD NDL EZ CLN MOD 2.75IN

## (undated) DEVICE — ST TBG AIRSEAL FLTR TRI LUM

## (undated) DEVICE — SPNG GZ WOVN 4X4IN 12PLY 10/BX STRL

## (undated) DEVICE — GLV SURG BIOGEL LTX PF 6 1/2